# Patient Record
Sex: FEMALE | Race: WHITE | NOT HISPANIC OR LATINO | Employment: OTHER | URBAN - NONMETROPOLITAN AREA
[De-identification: names, ages, dates, MRNs, and addresses within clinical notes are randomized per-mention and may not be internally consistent; named-entity substitution may affect disease eponyms.]

---

## 2017-04-15 ENCOUNTER — HISTORY (OUTPATIENT)
Dept: EMERGENCY MEDICINE | Facility: OTHER | Age: 67
End: 2017-04-15

## 2018-02-01 ENCOUNTER — DOCUMENTATION ONLY (OUTPATIENT)
Dept: FAMILY MEDICINE | Facility: OTHER | Age: 68
End: 2018-02-01

## 2018-02-01 RX ORDER — ACETAMINOPHEN 500 MG
500 TABLET ORAL EVERY 6 HOURS PRN
COMMUNITY
Start: 2013-12-19 | End: 2019-03-27

## 2018-02-01 RX ORDER — FLUTICASONE PROPIONATE 50 MCG
1 SPRAY, SUSPENSION (ML) NASAL DAILY
COMMUNITY
Start: 2014-03-04 | End: 2019-03-27

## 2019-03-27 ENCOUNTER — OFFICE VISIT (OUTPATIENT)
Dept: OTOLARYNGOLOGY | Facility: OTHER | Age: 69
End: 2019-03-27
Attending: NURSE PRACTITIONER
Payer: COMMERCIAL

## 2019-03-27 VITALS
TEMPERATURE: 98.1 F | DIASTOLIC BLOOD PRESSURE: 58 MMHG | OXYGEN SATURATION: 98 % | BODY MASS INDEX: 21.85 KG/M2 | HEART RATE: 82 BPM | SYSTOLIC BLOOD PRESSURE: 106 MMHG | HEIGHT: 64 IN | WEIGHT: 128 LBS

## 2019-03-27 DIAGNOSIS — H90.8 MIXED CONDUCTIVE AND SENSORINEURAL HEARING LOSS, UNSPECIFIED LATERALITY: ICD-10-CM

## 2019-03-27 DIAGNOSIS — H71.21 CHOLESTEATOMA OF RIGHT MASTOID: ICD-10-CM

## 2019-03-27 DIAGNOSIS — Z71.6 TOBACCO ABUSE COUNSELING: ICD-10-CM

## 2019-03-27 DIAGNOSIS — H70.10 CHRONIC MASTOIDITIS, UNSPECIFIED LATERALITY: ICD-10-CM

## 2019-03-27 DIAGNOSIS — Z90.89 HISTORY OF MASTOIDECTOMY: ICD-10-CM

## 2019-03-27 DIAGNOSIS — H61.899 DEBRIS IN EAR CANAL: Primary | ICD-10-CM

## 2019-03-27 DIAGNOSIS — H95.193 ENCOUNTER FOR MASTOIDECTOMY CAVITY DEBRIDEMENT, BILATERAL: ICD-10-CM

## 2019-03-27 PROCEDURE — 92504 EAR MICROSCOPY EXAMINATION: CPT | Performed by: NURSE PRACTITIONER

## 2019-03-27 PROCEDURE — 69210 REMOVE IMPACTED EAR WAX UNI: CPT | Performed by: NURSE PRACTITIONER

## 2019-03-27 PROCEDURE — 99213 OFFICE O/P EST LOW 20 MIN: CPT | Mod: 25 | Performed by: NURSE PRACTITIONER

## 2019-03-27 PROCEDURE — G0463 HOSPITAL OUTPT CLINIC VISIT: HCPCS

## 2019-03-27 RX ORDER — CIPROFLOXACIN AND DEXAMETHASONE 3; 1 MG/ML; MG/ML
4 SUSPENSION/ DROPS AURICULAR (OTIC) 2 TIMES DAILY
Qty: 1 BOTTLE | Refills: 0 | Status: SHIPPED | OUTPATIENT
Start: 2019-03-27 | End: 2019-05-31

## 2019-03-27 ASSESSMENT — MIFFLIN-ST. JEOR: SCORE: 1105.6

## 2019-03-27 ASSESSMENT — PAIN SCALES - GENERAL: PAINLEVEL: NO PAIN (0)

## 2019-03-27 NOTE — PATIENT INSTRUCTIONS
Thank you for allowing Tracy Garcia CNP and our ENT team to participate in your care.  If your medications are too expensive, please give the nurse a call.  We can possibly change this medication.  If you have a scheduling or an appointment question please contact [facility] Coordinator at their direct line [phone]  ALL nursing questions or concerns can be directed to your ENT nurse at: [phone]- Npgl    Complete drops to both ears, twice a day for one week  Complete an audiogram.  Follow  Up with ENT in 1-2 weeks.

## 2019-03-27 NOTE — NURSING NOTE
"Chief Complaint   Patient presents with     Cerumen Impaction     Ear Cleaning       Initial /58 (Cuff Size: Adult Regular)   Pulse 82   Temp 98.1  F (36.7  C) (Tympanic)   Ht 1.626 m (5' 4\")   Wt 58.1 kg (128 lb)   SpO2 98%   BMI 21.97 kg/m   Estimated body mass index is 21.97 kg/m  as calculated from the following:    Height as of this encounter: 1.626 m (5' 4\").    Weight as of this encounter: 58.1 kg (128 lb).  Medication Reconciliation: complete    Brenda Hartman LPN    "

## 2019-03-27 NOTE — PROGRESS NOTES
Otolaryngology Note         Chief Complaint:     Patient presents with:  Cerumen Impaction: Ear Cleaning         History of Present Illness:     Valentina Phillips is here today for ear cleaning. She has noted history of chronic mastoiditis along with cholesteatoma of the right mastoid, requring right canal wall down tympanomastoidectomy that was done 5/6/14 by Dr. Obrien along with distant history of mastoidectomy x 3 in the left ear.     Hearing loss has been gradual over the years with no acute changes. No fluctuating hearing loss.  No otalgia, otorrhea or aura fullness.  Vertigo: denies  Tinnitus: denies  There is no facial weakness, facial numbness or dysphagia.    Most recent audiogram 1/27/16    Current every day smoker.         Medications:     Current Outpatient Rx   Medication Sig Dispense Refill     Acetaminophen (TYLENOL EXTRA STRENGTH PO) Take as directed, as needed.       fluticasone (FLONASE) 50 MCG/ACT nasal spray Spray 2 sprays into both nostrils daily 1 Package 12            Allergies:     Allergies: Aspirin          Past Medical History:     Past Medical History:   Diagnosis Date     Disorder of teeth or supporting structures     No Comments Provided     Hearing loss     No Comments Provided     Toxic shock syndrome (H)     No Comments Provided            Past Surgical History:     Past Surgical History:   Procedure Laterality Date     MYRINGOTOMY, INSERT TUBE, COMBINED      multiple     OTHER SURGICAL HISTORY      [id number],MASTOIDECTOMY,x 3 left ear       ENT family history reviewed         Social History:     Social History     Tobacco Use     Smoking status: Current Every Day Smoker     Packs/day: 1.00     Types: Cigarettes     Smokeless tobacco: Never Used   Substance Use Topics     Alcohol use: Not on file     Drug use: Not on file            Review of Systems:     ROS: See HPI         Physical Exam:     /58 (Cuff Size: Adult Regular)   Pulse 82   Temp 98.1  F (36.7  C) (Tympanic)   Ht  "1.626 m (5' 4\")   Wt 58.1 kg (128 lb)   SpO2 98%   BMI 21.97 kg/m    General - The patient is well nourished and well developed, and appears to have good nutritional status.  Alert and oriented to person and place, answers questions and cooperates with examination appropriately.   Head and Face - Normocephalic and atraumatic, with no gross asymmetry noted.  The facial nerve is intact, with strong symmetric movements.  Voice and Breathing - The patient was breathing comfortably without the use of accessory muscles. There was no wheezing, stridor. The patients voice was clear and strong, and had appropriate pitch and quality.  Ears - External ear normal. Well healed post auricular incision. Bilateral ears with complete cerumen impaction.  Ears examined under microscope. Left ear meatoplasty. Mastoid cavity filled with debris/cerumen which was successfully debrided with use of #5 and #7 suction, along with cupped forceps. Minimal irritation developed after debridement, otherwise mastoid lining intact. Right meatoplasty and mastoid cavity with thick/hard/dry debris which small amount was successful debrided from mastoid cavity with cupped forceps, but she could not tolerate further debridement due to discomfort.  Eyes - Extraocular movements intact, and the pupils were reactive to light. Sclera were not icteric or injected, conjunctiva were pink and moist.  Mouth - Examination of the oral cavity showed pink, healthy oral mucosa. She removed lower dentures (refused removal of upper dentures) for examination. No lesions or ulcerations noted. The tongue was mobile and midline.   Throat - The walls of the oropharynx were smooth, pink, moist, symmetric, and had no lesions or ulcerations.  The tonsillar pillars and soft palate were symmetric. The uvula was midline on elevation.    Neck - Normal midline excursion of the laryngotracheal complex during swallowing.  Full range of motion on passive movement.  Palpation of the " occipital, submental, submandibular, internal jugular chain, and supraclavicular nodes did not demonstrate any abnormal lymph nodes or masses.  Palpation of the thyroid was soft and smooth, with no nodules or goiter appreciated.  The trachea was mobile and midline.  Nose - External contour is symmetric, no gross deflection or scars.  Nasal mucosa is pink and moist with no abnormal mucus. No polyps, masses, or purulence noted on examination.         Assessment and Plan:       ICD-10-CM    1. Debris in ear canal H61.90 ciprofloxacin-dexamethasone (CIPRODEX) 0.3-0.1 % otic suspension   2. History of mastoidectomy Z98.890    3. History of cholesteatoma of right mastoid H71.21    4. Chronic mastoiditis, unspecified laterality H70.10    5. Encounter for mastoidectomy cavity debridement, bilateral H95.193    6. 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 Tobacco abuse counseling Z71.6      Left mastoid cavity successfully debrided but unable to complete debridement from right mastoid cavity due to discomfort noted. Spent 30-40 minutes on debridement    Complete Ciprodex drops to both ears as directed.'    She will return in 1-2 weeks to complete debridement.    She will get an audiogram scheduled in the near future, after successful debridement.    Discussed routine 6 month ear debridement of mastoid cavities.    Discussed with patient that it takes 20 years of quitting tobacco to be at same risk of developing head and neck cancer as a person who has never used tobacco. Patient voiced understanding to ongoing risks associated with continued tobacco use. Tobacco cessation was strongly encouraged.    Encouraged her to follow-up sooner as needed.      Tracy Garcia NP  ENT  [facility], [place]  [phone]

## 2019-03-27 NOTE — LETTER
3/27/2019         RE: Valentina Phillips  [phone] [facility] [address]        Dear Colleague,    Thank you for referring your patient, Valentina Phillips, to the [facility]. Please see a copy of my visit note below.    Otolaryngology Note         Chief Complaint:     Patient presents with:  Cerumen Impaction: Ear Cleaning         History of Present Illness:     Valentina Phillips is here today for ear cleaning. She has noted history of chronic mastoiditis along with cholesteatoma of the right mastoid, requring right canal wall down tympanomastoidectomy that was done 5/6/14 by Dr. Obrien along with distant history of mastoidectomy x 3 in the left ear.     Hearing loss has been gradual over the years with no acute changes. No fluctuating hearing loss.  No otalgia, otorrhea or aura fullness.  Vertigo: denies  Tinnitus: denies  There is no facial weakness, facial numbness or dysphagia.    Most recent audiogram 1/27/16    Current every day smoker.         Medications:     Current Outpatient Rx   Medication Sig Dispense Refill     Acetaminophen (TYLENOL EXTRA STRENGTH PO) Take as directed, as needed.       fluticasone (FLONASE) 50 MCG/ACT nasal spray Spray 2 sprays into both nostrils daily 1 Package 12            Allergies:     Allergies: Aspirin          Past Medical History:     Past Medical History:   Diagnosis Date     Disorder of teeth or supporting structures     No Comments Provided     Hearing loss     No Comments Provided     Toxic shock syndrome (H)     No Comments Provided            Past Surgical History:     Past Surgical History:   Procedure Laterality Date     MYRINGOTOMY, INSERT TUBE, COMBINED      multiple     OTHER SURGICAL HISTORY      INB089,MASTOIDECTOMY,x 3 left ear       ENT family history reviewed         Social History:     Social History     Tobacco Use     Smoking status: Current Every Day Smoker     Packs/day: 1.00     Types: Cigarettes     Smokeless tobacco:  "Never Used   Substance Use Topics     Alcohol use: Not on file     Drug use: Not on file            Review of Systems:     ROS: See HPI         Physical Exam:     /58 (Cuff Size: Adult Regular)   Pulse 82   Temp 98.1  F (36.7  C) (Tympanic)   Ht 1.626 m (5' 4\")   Wt 58.1 kg (128 lb)   SpO2 98%   BMI 21.97 kg/m     General - The patient is well nourished and well developed, and appears to have good nutritional status.  Alert and oriented to person and place, answers questions and cooperates with examination appropriately.   Head and Face - Normocephalic and atraumatic, with no gross asymmetry noted.  The facial nerve is intact, with strong symmetric movements.  Voice and Breathing - The patient was breathing comfortably without the use of accessory muscles. There was no wheezing, stridor. The patients voice was clear and strong, and had appropriate pitch and quality.  Ears - External ear normal. Well healed post auricular incision. Bilateral ears with complete cerumen impaction.  Ears examined under microscope. Left ear meatoplasty. Mastoid cavity filled with debris/cerumen which was successfully debrided with use of #5 and #7 suction, along with cupped forceps. Minimal irritation developed after debridement, otherwise mastoid lining intact. Right meatoplasty and mastoid cavity with thick/hard/dry debris which small amount was successful debrided from mastoid cavity with cupped forceps, but she could not tolerate further debridement due to discomfort.  Eyes - Extraocular movements intact, and the pupils were reactive to light. Sclera were not icteric or injected, conjunctiva were pink and moist.  Mouth - Examination of the oral cavity showed pink, healthy oral mucosa. She removed lower dentures (refused removal of upper dentures) for examination. No lesions or ulcerations noted. The tongue was mobile and midline.   Throat - The walls of the oropharynx were smooth, pink, moist, symmetric, and had no " lesions or ulcerations.  The tonsillar pillars and soft palate were symmetric. The uvula was midline on elevation.    Neck - Normal midline excursion of the laryngotracheal complex during swallowing.  Full range of motion on passive movement.  Palpation of the occipital, submental, submandibular, internal jugular chain, and supraclavicular nodes did not demonstrate any abnormal lymph nodes or masses.  Palpation of the thyroid was soft and smooth, with no nodules or goiter appreciated.  The trachea was mobile and midline.  Nose - External contour is symmetric, no gross deflection or scars.  Nasal mucosa is pink and moist with no abnormal mucus. No polyps, masses, or purulence noted on examination.         Assessment and Plan:       ICD-10-CM    1. Debris in ear canal H61.90 ciprofloxacin-dexamethasone (CIPRODEX) 0.3-0.1 % otic suspension   2. History of mastoidectomy Z98.890    3. History of cholesteatoma of right mastoid H71.21    4. Chronic mastoiditis, unspecified laterality H70.10    5. Encounter for mastoidectomy cavity debridement, bilateral H95.193    6. 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 Tobacco abuse counseling Z71.6      Left mastoid cavity successfully debrided but unable to complete debridement from right mastoid cavity due to discomfort noted. Spent 30-40 minutes on debridement    Complete Ciprodex drops to both ears as directed.'    She will return in 1-2 weeks to complete debridement.    She will get an audiogram scheduled in the near future, after successful debridement.    Discussed routine 6 month ear debridement of mastoid cavities.    Discussed with patient that it takes 20 years of quitting tobacco to be at same risk of developing head and neck cancer as a person who has never used tobacco. Patient voiced understanding to ongoing risks associated with continued tobacco use. Tobacco cessation was strongly encouraged.    Encouraged her to follow-up  sooner as needed.      Tracy Garcia NP  ENT  [facility], [place]  [phone]      Again, thank you for allowing me to participate in the care of your patient.        Sincerely,        VERNON Sloan CNP

## 2019-04-04 DIAGNOSIS — H91.93 DECREASED HEARING OF BOTH EARS: Primary | ICD-10-CM

## 2019-04-08 ENCOUNTER — OFFICE VISIT (OUTPATIENT)
Dept: OTOLARYNGOLOGY | Facility: OTHER | Age: 69
End: 2019-04-08
Attending: NURSE PRACTITIONER
Payer: MEDICARE

## 2019-04-08 VITALS
HEIGHT: 64 IN | WEIGHT: 128 LBS | HEART RATE: 80 BPM | TEMPERATURE: 98.6 F | DIASTOLIC BLOOD PRESSURE: 64 MMHG | OXYGEN SATURATION: 98 % | BODY MASS INDEX: 21.85 KG/M2 | SYSTOLIC BLOOD PRESSURE: 116 MMHG

## 2019-04-08 DIAGNOSIS — Z98.890 HX OF TYMPANOMASTOIDECTOMY: Primary | ICD-10-CM

## 2019-04-08 DIAGNOSIS — H90.8 MIXED CONDUCTIVE AND SENSORINEURAL HEARING LOSS, UNSPECIFIED LATERALITY: ICD-10-CM

## 2019-04-08 DIAGNOSIS — Z71.6 TOBACCO ABUSE COUNSELING: ICD-10-CM

## 2019-04-08 DIAGNOSIS — H61.899 DEBRIS IN EAR CANAL: ICD-10-CM

## 2019-04-08 PROCEDURE — G0463 HOSPITAL OUTPT CLINIC VISIT: HCPCS | Mod: 25

## 2019-04-08 PROCEDURE — 69210 REMOVE IMPACTED EAR WAX UNI: CPT | Performed by: NURSE PRACTITIONER

## 2019-04-08 PROCEDURE — 99212 OFFICE O/P EST SF 10 MIN: CPT | Mod: 25 | Performed by: NURSE PRACTITIONER

## 2019-04-08 ASSESSMENT — PAIN SCALES - GENERAL: PAINLEVEL: NO PAIN (0)

## 2019-04-08 ASSESSMENT — MIFFLIN-ST. JEOR: SCORE: 1105.6

## 2019-04-08 NOTE — PROGRESS NOTES
"Otolaryngology Progress Note           Chief Complaint:     Chief Complaint   Patient presents with     Follow Up     Mastoid cleaning          History of Present Illness:     Valentina Phillips is a 68 year old female, history of mixed hearing loss, right intact bridge canal wall down tympanomastoidectomy 5/6/14 by Dr. Obrien, here for follow-up.     I last saw her 3/27/19 and was able to successfully debris the left ear with just minimal irritation, but had to stop debridement of the right ear due to discomfort. She has copious amounts of thick and hardened debris throughout, so had her complete antibiotic otic drops to both ears.     No changes in hearing.  No fluctuating hearing loss.  No otalgia, otorrhea or aura fullness.  Vertigo: denies  Tinnitus: denies  There is no facial weakness, facial numbness or dysphagia.    Current every day smoker.    Future audiogram set up for 4/22/19    Audiogram 1/27/16  Tympanograms Type B large volume bilaterally  Right ear thresholds moderate to severe/profound mixed hearing loss, SRT 60 dBHL, %  Left ear thresholds moderate to severe mixed hearing loss, SRT 60 dBHL, %  SRT = PTA         Review of Systems:     ROS: See HPI         Physical Exam:     /64 (BP Location: Right arm)   Pulse 80   Temp 98.6  F (37  C) (Tympanic)   Ht 1.626 m (5' 4\")   Wt 58.1 kg (128 lb)   SpO2 98%   BMI 21.97 kg/m      General - The patient is well nourished and well developed, and appears to have good nutritional status.  Alert and oriented to person and place, interactive.  Head and Face - Normocephalic and atraumatic, with no gross asymmetry noted of the contour of the facial features.  The facial nerve is intact, with strong symmetric movements.  Neck- No palpable lymphadenopathy or thyroid mass.  Trachea is midline.  Eyes - Extraocular movements intact.   Ears- External ears normal. Left meatoplasty. EAC/mastoid bowl intact, healthy, no otorrhea. Right EAC/mastoid cavity " with thick debris causing complete impaction. (see below)  Nose - Nasal mucosa is pink and moist with no abnormal mucus.  The septum was grossly midline and non-obstructive, turbinates of normal size and position.  No polyps, masses, or purulence noted on examination.  Mouth - Examination of the oral cavity shows pink, healthy, moist mucosa.  No lesions or ulceration noted. The tongue is mobile and midline.    Throat - The walls of the oropharynx were smooth, pink, moist, symmetric, and had no lesions or ulcerations.  The tonsillar pillars and soft palate were symmetric.  The uvula was midline on elevation.      On examination of the ears, I found that the ear(s) were completely impacted with dense cerumen.  Therefore, I positioned them in the examination chair in a semi-supine position, beginning with the right side. Findings consistent with meatoplasty.  I used the binocular surgical microscope to perform cerumen removal.  I began by using a cerumen loop to gently lift the edges of the cerumen mass away from the walls of the external canal.  Once I did this, I was able to suction away fragments of wax and debris using suction.  Once the mass was loose enough, the entire plug was pulled from the canal/mastoid bowl. Tissue in mastoid bowl appears healthy. No polyps or granulation tissue. Just mild irritation from where debris was removed.  The tympanic membrane was intact, no sign of perforation or middle ear effusion.           Assessment and Plan:       ICD-10-CM    1. Hx of tympanomastoidectomy Z98.890    2. Tobacco abuse counseling Z71.6    3. Debris in ear canal H61.90    4. Mixed conductive and sensorineural hearing loss, unspecified laterality H90.8      Discussed with patient that it takes 20 years of quitting tobacco to be at same risk of developing head and neck cancer as a person who has never used tobacco. Patient voiced understanding to ongoing risks associated with continued tobacco use. Tobacco  cessation was strongly encouraged.    Mastoid bowls both cleaned and healthy.    Do 4 more days of ciprodex to right ear.    Complete upcoming audiogram.    Return in 6 months for routine mastoid bowl cleaning.    Follow up sooner as needed.     Tracy Garcia NP  ENT  [facility], [place]  [phone]

## 2019-04-08 NOTE — LETTER
"    4/8/2019         RE: Valentina Phillips  [phone] [place] [address]        Dear Colleague,    Thank you for referring your patient, Valentina Phillips, to the [facility]. Please see a copy of my visit note below.    Otolaryngology Progress Note           Chief Complaint:     Chief Complaint   Patient presents with     Follow Up     Mastoid cleaning          History of Present Illness:     Valentina Phillips is a 68 year old female, history of mixed hearing loss, right intact bridge canal wall down tympanomastoidectomy 5/6/14 by Dr. Obrien, here for follow-up.     I last saw her 3/27/19 and was able to successfully debris the left ear with just minimal irritation, but had to stop debridement of the right ear due to discomfort. She has copious amounts of thick and hardened debris throughout, so had her complete antibiotic otic drops to both ears.     No changes in hearing.  No fluctuating hearing loss.  No otalgia, otorrhea or aura fullness.  Vertigo: denies  Tinnitus: denies  There is no facial weakness, facial numbness or dysphagia.    Current every day smoker.    Future audiogram set up for 4/22/19    Audiogram 1/27/16  Tympanograms Type B large volume bilaterally  Right ear thresholds moderate to severe/profound mixed hearing loss, SRT 60 dBHL, %  Left ear thresholds moderate to severe mixed hearing loss, SRT 60 dBHL, %  SRT = PTA         Review of Systems:     ROS: See HPI         Physical Exam:     /64 (BP Location: Right arm)   Pulse 80   Temp 98.6  F (37  C) (Tympanic)   Ht 1.626 m (5' 4\")   Wt 58.1 kg (128 lb)   SpO2 98%   BMI 21.97 kg/m       General - The patient is well nourished and well developed, and appears to have good nutritional status.  Alert and oriented to person and place, interactive.  Head and Face - Normocephalic and atraumatic, with no gross asymmetry noted of the contour of the facial features.  The facial nerve is intact, with " strong symmetric movements.  Neck- No palpable lymphadenopathy or thyroid mass.  Trachea is midline.  Eyes - Extraocular movements intact.   Ears- External ears normal. Left meatoplasty. EAC/mastoid bowl intact, healthy, no otorrhea. Right EAC/mastoid cavity with thick debris causing complete impaction. (see below)  Nose - Nasal mucosa is pink and moist with no abnormal mucus.  The septum was grossly midline and non-obstructive, turbinates of normal size and position.  No polyps, masses, or purulence noted on examination.  Mouth - Examination of the oral cavity shows pink, healthy, moist mucosa.  No lesions or ulceration noted. The tongue is mobile and midline.    Throat - The walls of the oropharynx were smooth, pink, moist, symmetric, and had no lesions or ulcerations.  The tonsillar pillars and soft palate were symmetric.  The uvula was midline on elevation.      On examination of the ears, I found that the ear(s) were completely impacted with dense cerumen.  Therefore, I positioned them in the examination chair in a semi-supine position, beginning with the right side. Findings consistent with meatoplasty.  I used the binocular surgical microscope to perform cerumen removal.  I began by using a cerumen loop to gently lift the edges of the cerumen mass away from the walls of the external canal.  Once I did this, I was able to suction away fragments of wax and debris using suction.  Once the mass was loose enough, the entire plug was pulled from the canal/mastoid bowl. Tissue in mastoid bowl appears healthy. No polyps or granulation tissue. Just mild irritation from where debris was removed.  The tympanic membrane was intact, no sign of perforation or middle ear effusion.           Assessment and Plan:       ICD-10-CM    1. Hx of tympanomastoidectomy Z98.890    2. Tobacco abuse counseling Z71.6    3. Debris in ear canal H61.90    4. Mixed conductive and sensorineural hearing loss, unspecified laterality H90.8       Discussed with patient that it takes 20 years of quitting tobacco to be at same risk of developing head and neck cancer as a person who has never used tobacco. Patient voiced understanding to ongoing risks associated with continued tobacco use. Tobacco cessation was strongly encouraged.    Mastoid bowls both cleaned and healthy.    Do 4 more days of ciprodex to right ear.    Complete upcoming audiogram.    Return in 6 months for routine mastoid bowl cleaning.    Follow up sooner as needed.     Tracy Garcia NP  ENT  [facility] [place], [place]  [phone]              Again, thank you for allowing me to participate in the care of your patient.        Sincerely,        Tracy Garcia, VERNON CNP

## 2019-04-08 NOTE — PATIENT INSTRUCTIONS
Thank you for allowing Tracy Garcia CNP and our ENT team to participate in your care.  If your medications are too expensive, please give the nurse a call.  We can possibly change this medication.  If you have a scheduling or an appointment question please contact [facility] Unit Coordinator at their direct line [phone]  ALL nursing questions or concerns can be directed to your ENT nurse at: [phone] - Christy    Continue drops to ears for 4 days.  Follow up in 6 months for routine ear cleaning.

## 2019-04-08 NOTE — NURSING NOTE
"Chief Complaint   Patient presents with     Follow Up     Mastoid cleaning       Initial /64 (BP Location: Right arm)   Pulse 80   Temp 98.6  F (37  C) (Tympanic)   Ht 1.626 m (5' 4\")   Wt 58.1 kg (128 lb)   SpO2 98%   BMI 21.97 kg/m   Estimated body mass index is 21.97 kg/m  as calculated from the following:    Height as of this encounter: 1.626 m (5' 4\").    Weight as of this encounter: 58.1 kg (128 lb).  Medication Reconciliation: complete    Jory Forrester LPN  "

## 2019-04-22 ENCOUNTER — OFFICE VISIT (OUTPATIENT)
Dept: AUDIOLOGY | Facility: OTHER | Age: 69
End: 2019-04-22
Attending: AUDIOLOGIST
Payer: MEDICARE

## 2019-04-22 DIAGNOSIS — H90.6 MIXED HEARING LOSS, BILATERAL: Primary | ICD-10-CM

## 2019-04-22 DIAGNOSIS — H90.6 MIXED CONDUCTIVE AND SENSORINEURAL HEARING LOSS OF BOTH EARS: ICD-10-CM

## 2019-04-22 PROCEDURE — 92567 TYMPANOMETRY: CPT | Performed by: AUDIOLOGIST

## 2019-04-22 PROCEDURE — 92557 COMPREHENSIVE HEARING TEST: CPT | Performed by: AUDIOLOGIST

## 2019-04-22 NOTE — PROGRESS NOTES
Audiology Evaluation Completed. Please refer SCANNED AUDIOGRAM and/or TYMPANOGRAM for BACKGROUND, RESULTS, RECOMMENDATIONS.      Trang VARGAS, [facility]  Audiologist #[id number]

## 2019-05-30 ENCOUNTER — HOSPITAL ENCOUNTER (EMERGENCY)
Facility: OTHER | Age: 69
Discharge: HOME | End: 2019-05-30
Attending: FAMILY MEDICINE | Admitting: FAMILY MEDICINE
Payer: MEDICARE

## 2019-05-30 ENCOUNTER — APPOINTMENT (OUTPATIENT)
Dept: GENERAL RADIOLOGY | Facility: OTHER | Age: 69
End: 2019-05-30
Attending: FAMILY MEDICINE
Payer: MEDICARE

## 2019-05-30 ENCOUNTER — NURSE TRIAGE (OUTPATIENT)
Dept: FAMILY MEDICINE | Facility: OTHER | Age: 69
End: 2019-05-30

## 2019-05-30 VITALS
WEIGHT: 128 LBS | RESPIRATION RATE: 20 BRPM | SYSTOLIC BLOOD PRESSURE: 121 MMHG | TEMPERATURE: 98.5 F | HEIGHT: 64 IN | OXYGEN SATURATION: 97 % | BODY MASS INDEX: 21.85 KG/M2 | DIASTOLIC BLOOD PRESSURE: 60 MMHG | HEART RATE: 60 BPM

## 2019-05-30 DIAGNOSIS — R04.2 HEMOPTYSIS: ICD-10-CM

## 2019-05-30 DIAGNOSIS — F17.200 TOBACCO DEPENDENCE SYNDROME: ICD-10-CM

## 2019-05-30 LAB
ABO + RH BLD: NORMAL
ABO + RH BLD: NORMAL
ALBUMIN SERPL-MCNC: 3.9 G/DL (ref 3.5–5.7)
ALBUMIN UR-MCNC: NEGATIVE MG/DL
ALP SERPL-CCNC: 87 U/L (ref 34–104)
ALT SERPL W P-5'-P-CCNC: 18 U/L (ref 7–52)
ANION GAP SERPL CALCULATED.3IONS-SCNC: 5 MMOL/L (ref 3–14)
APPEARANCE UR: CLEAR
AST SERPL W P-5'-P-CCNC: 18 U/L (ref 13–39)
BASOPHILS # BLD AUTO: 0.1 10E9/L (ref 0–0.2)
BASOPHILS NFR BLD AUTO: 0.8 %
BILIRUB SERPL-MCNC: 0.7 MG/DL (ref 0.3–1)
BILIRUB UR QL STRIP: NEGATIVE
BLD GP AB SCN SERPL QL: NORMAL
BLOOD BANK CMNT PATIENT-IMP: NORMAL
BUN SERPL-MCNC: 11 MG/DL (ref 7–25)
CALCIUM SERPL-MCNC: 9.2 MG/DL (ref 8.6–10.3)
CHLORIDE SERPL-SCNC: 105 MMOL/L (ref 98–107)
CO2 SERPL-SCNC: 28 MMOL/L (ref 21–31)
COLOR UR AUTO: YELLOW
CREAT SERPL-MCNC: 0.71 MG/DL (ref 0.6–1.2)
DIFFERENTIAL METHOD BLD: NORMAL
EOSINOPHIL # BLD AUTO: 0.2 10E9/L (ref 0–0.7)
EOSINOPHIL NFR BLD AUTO: 2.7 %
ERYTHROCYTE [DISTWIDTH] IN BLOOD BY AUTOMATED COUNT: 13.5 % (ref 10–15)
GFR SERPL CREATININE-BSD FRML MDRD: 82 ML/MIN/{1.73_M2}
GLUCOSE SERPL-MCNC: 93 MG/DL (ref 70–105)
GLUCOSE UR STRIP-MCNC: NEGATIVE MG/DL
HCT VFR BLD AUTO: 43.9 % (ref 35–47)
HGB BLD-MCNC: 14.6 G/DL (ref 11.7–15.7)
HGB UR QL STRIP: NEGATIVE
IMM GRANULOCYTES # BLD: 0 10E9/L (ref 0–0.4)
IMM GRANULOCYTES NFR BLD: 0.2 %
INR PPP: 0.94 (ref 0–1.3)
KETONES UR STRIP-MCNC: NEGATIVE MG/DL
LEUKOCYTE ESTERASE UR QL STRIP: NEGATIVE
LYMPHOCYTES # BLD AUTO: 3.2 10E9/L (ref 0.8–5.3)
LYMPHOCYTES NFR BLD AUTO: 36.7 %
MCH RBC QN AUTO: 30.8 PG (ref 26.5–33)
MCHC RBC AUTO-ENTMCNC: 33.3 G/DL (ref 31.5–36.5)
MCV RBC AUTO: 93 FL (ref 78–100)
MONOCYTES # BLD AUTO: 0.6 10E9/L (ref 0–1.3)
MONOCYTES NFR BLD AUTO: 7 %
NEUTROPHILS # BLD AUTO: 4.6 10E9/L (ref 1.6–8.3)
NEUTROPHILS NFR BLD AUTO: 52.6 %
NITRATE UR QL: NEGATIVE
PH UR STRIP: 7.5 PH (ref 5–9)
PLATELET # BLD AUTO: 236 10E9/L (ref 150–450)
POTASSIUM SERPL-SCNC: 4 MMOL/L (ref 3.5–5.1)
PROT SERPL-MCNC: 6.7 G/DL (ref 6.4–8.9)
RBC # BLD AUTO: 4.74 10E12/L (ref 3.8–5.2)
SODIUM SERPL-SCNC: 138 MMOL/L (ref 134–144)
SOURCE: NORMAL
SP GR UR STRIP: 1.01 (ref 1–1.03)
SPECIMEN EXP DATE BLD: NORMAL
UROBILINOGEN UR STRIP-ACNC: 0.2 EU/DL (ref 0.2–1)
WBC # BLD AUTO: 8.7 10E9/L (ref 4–11)

## 2019-05-30 PROCEDURE — 99284 EMERGENCY DEPT VISIT MOD MDM: CPT | Mod: 25 | Performed by: FAMILY MEDICINE

## 2019-05-30 PROCEDURE — 80053 COMPREHEN METABOLIC PANEL: CPT | Performed by: FAMILY MEDICINE

## 2019-05-30 PROCEDURE — 85025 COMPLETE CBC W/AUTO DIFF WBC: CPT | Performed by: FAMILY MEDICINE

## 2019-05-30 PROCEDURE — 86900 BLOOD TYPING SEROLOGIC ABO: CPT | Performed by: FAMILY MEDICINE

## 2019-05-30 PROCEDURE — 36415 COLL VENOUS BLD VENIPUNCTURE: CPT | Performed by: FAMILY MEDICINE

## 2019-05-30 PROCEDURE — 85610 PROTHROMBIN TIME: CPT | Performed by: FAMILY MEDICINE

## 2019-05-30 PROCEDURE — 86850 RBC ANTIBODY SCREEN: CPT | Performed by: FAMILY MEDICINE

## 2019-05-30 PROCEDURE — 87040 BLOOD CULTURE FOR BACTERIA: CPT | Performed by: FAMILY MEDICINE

## 2019-05-30 PROCEDURE — 86901 BLOOD TYPING SEROLOGIC RH(D): CPT | Performed by: FAMILY MEDICINE

## 2019-05-30 PROCEDURE — 71046 X-RAY EXAM CHEST 2 VIEWS: CPT

## 2019-05-30 PROCEDURE — 99283 EMERGENCY DEPT VISIT LOW MDM: CPT | Mod: Z6 | Performed by: FAMILY MEDICINE

## 2019-05-30 PROCEDURE — 81003 URINALYSIS AUTO W/O SCOPE: CPT | Performed by: FAMILY MEDICINE

## 2019-05-30 ASSESSMENT — ENCOUNTER SYMPTOMS
CARDIOVASCULAR NEGATIVE: 1
RHINORRHEA: 1
ABDOMINAL PAIN: 0
SHORTNESS OF BREATH: 0
GASTROINTESTINAL NEGATIVE: 1
FEVER: 0
COUGH: 0

## 2019-05-30 ASSESSMENT — MIFFLIN-ST. JEOR: SCORE: 1105.6

## 2019-05-30 NOTE — TELEPHONE ENCOUNTER
"Writer was asked to complete triage on patient by Marlyn in scheduling as she is calling in as she is vomiting blood. Writer accepted soft transfer and completed triage as follows with disposition for an ED visit now. Patient is in agreement with disposition as noted. States she will come into the ED now for an evaluation. Writer will close this encounter at this time.    Reason for Disposition    Unclear to triager if the patient is coughing up blood or vomiting blood    Additional Information    Negative: Severe difficulty breathing (e.g., struggling for each breath, speaks in single words)    Negative: [1] Chest pain AND [2] difficulty breathing    Negative: Bluish (or gray) lips or face now    Negative: Passed out (i.e., lost consciousness, collapsed and was not responding)    Negative: Shock suspected (e.g., cold/pale/clammy skin, too weak to stand, low BP, rapid pulse)    Negative: Difficult to awaken or acting confused (e.g., disoriented, slurred speech)    Negative: Recent chest injury (i.e., past 24 hours)    Negative: [1] Coughed up blood AND [2] large amount (example: \"a cup of blood\")    Negative: Sounds like a life-threatening emergency to the triager    Negative: Difficulty breathing    Negative: Chest pain    Answer Assessment - Initial Assessment Questions  1. ONSET: \"When did you start coughing up blood?\"      This morning she woke up and was coughing blood. Started about a half an hour ago.   2. SEVERITY: \"How many times?\" \"How much blood?\" (e.g., flecks, streaks, tablespoons, etc)      She was in the bathroom for 5-10 minutes. She states it was a lot of blood. She states it came out of her sinuses as well when she blew her nose. It was not blood clots or anything. It is definitely blood though.  3. COUGHING SPASMS: \"Did the blood appear after a coughing spell?\"        No. It was like she got up to clear her throat and then is started.  4. RESPIRATORY DISTRESS: \"Describe your breathing.\"       She " "is breathing as per her norm. She does have allergies. She is stuffed up.  5. FEVER: \"Do you have a fever?\" If so, ask: \"What is your temperature, how was it measured, and when did it start?\"      Denies a fever.  6. SPUTUM: \"Describe the color of your sputum\" (clear, white, yellow, green), \"Has there been any change recently?\"      Sputum is red.  7. CARDIAC HISTORY: \"Do you have any history of heart disease?\" (e.g., heart attack, congestive heart failure)       She feels fine. Stomach is a little queasy. She didn't fall or hit herself. No cardiac history as per patient. She does feel weak.   8. LUNG HISTORY: \"Do you have any history of lung disease?\"  (e.g., pulmonary embolus, asthma, emphysema)      She has major allergies. She has asthma as well.  9. PE RISK FACTORS: \"Do you have a history of blood clots?\" (or: recent major surgery, recent prolonged travel, bedridden )      Her legs have hurt for 2-3 days now. They just throb.  10. OTHER SYMPTOMS: \"Do you have any other symptoms?\" (e.g., nosebleed, chest pain, abdominal pain, vomiting)        Abdominal pain. Throat is sore from coughing it up. It was sore when she swallowed.  11. PREGNANCY: \"Is there any chance you are pregnant?\" \"When was your last menstrual period?\"        N/A  12. TRAVEL: \"Have you traveled out of the country in the last month?\" (e.g., travel history, exposures)        No    Protocols used: COUGHING UP BLOOD-A-    Yuniel Luevano RN on 5/30/2019 at 10:42 AM  "

## 2019-05-30 NOTE — ED AVS SNAPSHOT
[facility]  [address]  [place] [address]  Phone:  [phone]  Fax:  [phone]                                    Valentina Phillips   MRN: [id number]    Department:  [facility]   Date of Visit:  5/30/2019           After Visit Summary Signature Page    I have received my discharge instructions, and my questions have been answered. I have discussed any challenges I see with this plan with the nurse or doctor.    ..........................................................................................................................................  Patient/Patient Representative Signature      ..........................................................................................................................................  Patient Representative Print Name and Relationship to Patient    ..................................................               ................................................  Date                                   Time    ..........................................................................................................................................  Reviewed by Signature/Title    ...................................................              ..............................................  Date                                               Time          22EPIC Rev 08/18

## 2019-05-30 NOTE — ED NOTES
Pt brought cup with sample of what she has been coughing up.  Appears bright red mixed with clear sputum.

## 2019-05-30 NOTE — ED TRIAGE NOTES
Pt states when she woke this AM she started coughing up blood and mucous.  States it has since slowed down, but pt feels light headed, has sore throat, and nauseated off and on.  Denies having bloody nose.

## 2019-05-30 NOTE — DISCHARGE INSTRUCTIONS
Dear Ms. Alan,  It was nice to meet you.  As we talked, your initial labs and chest x-ray don't show an obvious cause of your hemoptysis (coughing up blood).  Your urine is clear.  It will be important to have clinic follow up tomorrow for repeat Hemoglobin check and scheduling pulmonology consult.    Long term it is most important to stop tobacco.    Return as needed for any worsening symptoms.    Dr. Brett Armando

## 2019-05-30 NOTE — ED PROVIDER NOTES
History     Chief Complaint   Patient presents with     Hematemesis     HPI  Valentina Phillips is a 68 year old female who woke up between 8 9 AM this morning and began coughing up blood.  She coughed up some bright red blood and says it was about a cup's worth and brings in a small sample to show what look like.  She started gagging after coughing of the blood because of the taste and had some nausea associated with that including some blood coming out of her nose but no vomiting, or vomiting blood.  She reports she did not have a bloody nose first.  She is not feeling short of breath.  She is not having a fever.  She is using Flonase nasal spray for her seasonal allergies right now.  She completed her Ciprodex eardrops..    Allergies:  Allergies   Allergen Reactions     Aspirin Shortness Of Breath       Problem List:    Patient Active Problem List    Diagnosis Date Noted     Lyme disease 07/27/2016     Priority: Medium     Cholesteatoma of right mastoid 04/14/2014     Priority: Medium     Nasal polyps 03/20/2014     Priority: Medium     Sinusitis, chronic 03/20/2014     Priority: Medium     Problem list name updated by automated process. Provider to review       MHL (mixed hearing loss) 03/20/2014     Priority: Medium     History of mastoidectomy 03/20/2014     Priority: Medium     Chronic mastoiditis 02/28/2014     Priority: Medium     Carotid artery calcification 12/19/2013     Priority: Medium     Ear canal mass 12/19/2013     Priority: Medium     Ear drainage right 12/19/2013     Priority: Medium     Smoker 12/19/2013     Priority: Medium     Mastoiditis 12/19/2013     Priority: Medium        Past Medical History:    Past Medical History:   Diagnosis Date     Disorder of teeth or supporting structures      Hearing loss      Toxic shock syndrome (H)        Past Surgical History:    Past Surgical History:   Procedure Laterality Date     MYRINGOTOMY, INSERT TUBE, COMBINED      multiple     OTHER SURGICAL HISTORY   "    [id number],MASTOIDECTOMY,x 3 left ear       Family History:    Family History   Problem Relation Age of Onset     Cancer Father         Cancer, age 68 of lung cancer     Other - See Comments Mother         Psychiatric illness,dementia, at [facility]       Social History:  Marital Status:   [2]  Social History     Tobacco Use     Smoking status: Current Every Day Smoker     Packs/day: 1.00     Types: Cigarettes     Smokeless tobacco: Never Used   Substance Use Topics     Alcohol use: None     Drug use: None        Medications:      Acetaminophen (TYLENOL EXTRA STRENGTH PO)   fluticasone (FLONASE) 50 MCG/ACT nasal spray         Review of Systems   Constitutional: Negative for fever.   HENT: Positive for congestion and rhinorrhea (Seasonal allergies.).    Respiratory: Negative for cough and shortness of breath.    Cardiovascular: Negative.  Negative for chest pain.   Gastrointestinal: Negative.  Negative for abdominal pain.        No melena or hematochezia.   Skin: Negative.        Physical Exam   BP: 119/58  Pulse: 84  Temp: 98.4  F (36.9  C)  Resp: 22  Height: 162.6 cm (5' 4\")  Weight: 58.1 kg (128 lb)  SpO2: 95 %      Physical Exam   Constitutional: She appears well-developed and well-nourished. No distress.   Well-appearing 68-year-old female in no current distress.  No cough.  Benign vital signs.  Hard of hearing with a left hearing aid in place.   HENT:   Head: Normocephalic and atraumatic.   Right Ear: External ear normal.   Left Ear: External ear normal.   Mouth/Throat: Oropharynx is clear and moist.   Dentures in place.  No posterior drainage/bleeding noted.  Nose with mild chronic swelling but no obvious bleeding and no scab noted.   Eyes: Pupils are equal, round, and reactive to light. Conjunctivae and EOM are normal.   Neck: Normal range of motion. Neck supple. No tracheal deviation present. No thyromegaly present.   Cardiovascular: Normal rate, regular rhythm and normal heart sounds. "   Pulmonary/Chest: Effort normal. She has no rales.   Mild asymmetric left-sided rhonchi and rare expiratory wheeze.   Abdominal: Soft. She exhibits no distension and no mass. There is tenderness (Mild midepigastric tenderness.). There is no rebound and no guarding.   Musculoskeletal: Normal range of motion. She exhibits no edema.   Lymphadenopathy:     She has no cervical adenopathy.   Neurological: She is alert. She exhibits normal muscle tone. Coordination normal.   Skin: Skin is warm and dry. No rash noted. She is not diaphoretic.   Psychiatric: She has a normal mood and affect.   Nursing note and vitals reviewed.      ED Course        Procedures               Critical Care time:  none               Results for orders placed or performed during the hospital encounter of 05/30/19 (from the past 24 hour(s))   CBC with platelets differential   Result Value Ref Range    WBC 8.7 4.0 - 11.0 10e9/L    RBC Count 4.74 3.8 - 5.2 10e12/L    Hemoglobin 14.6 11.7 - 15.7 g/dL    Hematocrit 43.9 35.0 - 47.0 %    MCV 93 78 - 100 fl    MCH 30.8 26.5 - 33.0 pg    MCHC 33.3 31.5 - 36.5 g/dL    RDW 13.5 10.0 - 15.0 %    Platelet Count 236 150 - 450 10e9/L    Diff Method Automated Method     % Neutrophils 52.6 %    % Lymphocytes 36.7 %    % Monocytes 7.0 %    % Eosinophils 2.7 %    % Basophils 0.8 %    % Immature Granulocytes 0.2 %    Absolute Neutrophil 4.6 1.6 - 8.3 10e9/L    Absolute Lymphocytes 3.2 0.8 - 5.3 10e9/L    Absolute Monocytes 0.6 0.0 - 1.3 10e9/L    Absolute Eosinophils 0.2 0.0 - 0.7 10e9/L    Absolute Basophils 0.1 0.0 - 0.2 10e9/L    Abs Immature Granulocytes 0.0 0 - 0.4 10e9/L   ABO/Rh type and screen   Result Value Ref Range    ABO O     RH(D) Pos     Antibody Screen Neg     Test Valid Only At [facility]        Specimen Expires 06/02/2019    INR   Result Value Ref Range    INR 0.94 0 - 1.3   Comprehensive metabolic panel   Result Value Ref Range    Sodium 138 134 - 144 mmol/L    Potassium  4.0 3.5 - 5.1 mmol/L    Chloride 105 98 - 107 mmol/L    Carbon Dioxide 28 21 - 31 mmol/L    Anion Gap 5 3 - 14 mmol/L    Glucose 93 70 - 105 mg/dL    Urea Nitrogen 11 7 - 25 mg/dL    Creatinine 0.71 0.60 - 1.20 mg/dL    GFR Estimate 82 >60 mL/min/[1.73_m2]    GFR Estimate If Black >90 >60 mL/min/[1.73_m2]    Calcium 9.2 8.6 - 10.3 mg/dL    Bilirubin Total 0.7 0.3 - 1.0 mg/dL    Albumin 3.9 3.5 - 5.7 g/dL    Protein Total 6.7 6.4 - 8.9 g/dL    Alkaline Phosphatase 87 34 - 104 U/L    ALT 18 7 - 52 U/L    AST 18 13 - 39 U/L   XR Chest 2 Views    Narrative    XR CHEST 2 VW    HISTORY: 68 years Female hemoptysis versus hematemesis.    COMPARISON: 2516    TECHNIQUE: 2 views of the chest were obtained.    FINDINGS: Two views of the chest were obtained. Heart size and  pulmonary vascularity are within normal limits, lungs are clear on  both views. No consolidating air space opacities are present.          Impression    IMPRESSION: Clear chest.    KIM DAWSON MD   *UA reflex to Microscopic   Result Value Ref Range    Color Urine Yellow     Appearance Urine Clear     Glucose Urine Negative NEG^Negative mg/dL    Bilirubin Urine Negative NEG^Negative    Ketones Urine Negative NEG^Negative mg/dL    Specific Gravity Urine 1.010 1.000 - 1.030    Blood Urine Negative NEG^Negative    pH Urine 7.5 5.0 - 9.0 pH    Protein Albumin Urine Negative NEG^Negative mg/dL    Urobilinogen Urine 0.2 0.2 - 1.0 EU/dL    Nitrite Urine Negative NEG^Negative    Leukocyte Esterase Urine Negative NEG^Negative    Source Midstream Urine        Medications - No data to display    12:51 PM recheck and no further coughing or coughing up blood.  We discussed that she continues to smoke and she is strongly encouraged to stop.  We discussed initial benign laboratory evaluations and will have her follow-up in clinic tomorrow for repeat hemoglobin with plans for pulmonology consult in the near future.    1:52 PM urine is returned normal and will discharge  home with f/u tomorrow for recheck.  Consult for pulmonology placed.    Assessments & Plan (with Medical Decision Making)   68-year-old chronic smoker with new hemoptysis this morning and no further in ED.  Stable exam and CXR and initial labs.  Recommending no smoking and f/u tomorrow for recheck of hemoglobin and schedule pulmonology consult.          I have reviewed the nursing notes.    I have reviewed the findings, diagnosis, plan and need for follow up with the patient.          Medication List      ASK your doctor about these medications    ciprofloxacin-dexamethasone 0.3-0.1 % otic suspension  Commonly known as:  CIPRODEX  4 drops, Both Ears, 2 TIMES DAILY  Ask about: Should I take this medication?            Final diagnoses:   Hemoptysis   Tobacco dependence syndrome       5/30/2019   [facility]     Karlo Armando MD  05/30/19 [id number]

## 2019-05-31 ENCOUNTER — OFFICE VISIT (OUTPATIENT)
Dept: FAMILY MEDICINE | Facility: OTHER | Age: 69
End: 2019-05-31
Attending: FAMILY MEDICINE
Payer: COMMERCIAL

## 2019-05-31 ENCOUNTER — NURSE TRIAGE (OUTPATIENT)
Dept: FAMILY MEDICINE | Facility: OTHER | Age: 69
End: 2019-05-31

## 2019-05-31 VITALS
RESPIRATION RATE: 18 BRPM | TEMPERATURE: 99.2 F | WEIGHT: 131.8 LBS | OXYGEN SATURATION: 94 % | HEART RATE: 77 BPM | BODY MASS INDEX: 22.62 KG/M2 | SYSTOLIC BLOOD PRESSURE: 100 MMHG | DIASTOLIC BLOOD PRESSURE: 52 MMHG

## 2019-05-31 DIAGNOSIS — R05.3 CHRONIC COUGH: ICD-10-CM

## 2019-05-31 DIAGNOSIS — F17.200 SMOKER: Primary | ICD-10-CM

## 2019-05-31 DIAGNOSIS — R04.0 EPISTAXIS: ICD-10-CM

## 2019-05-31 PROCEDURE — 99213 OFFICE O/P EST LOW 20 MIN: CPT | Performed by: FAMILY MEDICINE

## 2019-05-31 PROCEDURE — G0463 HOSPITAL OUTPT CLINIC VISIT: HCPCS

## 2019-05-31 ASSESSMENT — PAIN SCALES - GENERAL: PAINLEVEL: NO PAIN (0)

## 2019-05-31 NOTE — PROGRESS NOTES
SUBJECTIVE:   Valentina Phillips is a 68 year old female who presents to clinic today for the following health issues:  Nursing Notes:   Nancy Bullard LPN  5/31/2019  2:00 PM  Signed  Patient is here for follow up of ER and concerns of coughing up blood. States started yesterday morning. Patient was seen in ER yesterday for this. Nancy Bullard LPN .............5/31/2019     1:40 PM    No LMP recorded. Patient is postmenopausal.  Medication Reconciliation: complete    Nancy Bullard LPN  5/31/2019 1:40 PM        HPI  67 yo female presents for follow-up after recent emergency room visit for cough.  She reports yesterday morning had one episode of hemoptysis.  There is a trace amount of bright red blood in her sputum.  She contacted nurse triage and they recommend that she be seen.    Reviewing the ER note, chest x-ray was performed which showed hyperinflation but otherwise normal.  Hemoglobin was 14.6.    Patient smokes a half a pack to 1 pack of cigarettes per day.  She has minimal interest in quitting.    Patient Active Problem List    Diagnosis Date Noted     Lyme disease 07/27/2016     Priority: Medium     Cholesteatoma of right mastoid 04/14/2014     Priority: Medium     Nasal polyps 03/20/2014     Priority: Medium     Sinusitis, chronic 03/20/2014     Priority: Medium     Problem list name updated by automated process. Provider to review       MHL (mixed hearing loss) 03/20/2014     Priority: Medium     History of mastoidectomy 03/20/2014     Priority: Medium     Chronic mastoiditis 02/28/2014     Priority: Medium     Carotid artery calcification 12/19/2013     Priority: Medium     Ear canal mass 12/19/2013     Priority: Medium     Ear drainage right 12/19/2013     Priority: Medium     Smoker 12/19/2013     Priority: Medium     Mastoiditis 12/19/2013     Priority: Medium     Past Medical History:   Diagnosis Date     Disorder of teeth or supporting structures     No Comments Provided     Hearing loss      No Comments Provided     Toxic shock syndrome (H)     No Comments Provided      Current Outpatient Medications   Medication Sig Dispense Refill     Acetaminophen (TYLENOL EXTRA STRENGTH PO) Take as directed, as needed.       fluticasone (FLONASE) 50 MCG/ACT nasal spray Spray 2 sprays into both nostrils daily 1 Package 12     Allergies   Allergen Reactions     Aspirin Shortness Of Breath       Review of Systems   See hPi  OBJECTIVE:     /52 (BP Location: Right arm, Patient Position: Sitting, Cuff Size: Adult Regular)   Pulse 77   Temp 99.2  F (37.3  C) (Tympanic)   Resp 18   Wt 59.8 kg (131 lb 12.8 oz)   SpO2 94%   Breastfeeding? No   BMI 22.62 kg/m    Body mass index is 22.62 kg/m .  Physical Exam   Constitutional: She appears well-developed.   HENT:   Trace amount of blood in left nare   Cardiovascular: Normal rate and regular rhythm.   Pulmonary/Chest: Effort normal and breath sounds normal. No stridor. No respiratory distress. She has no wheezes. She has no rales.       Diagnostic Test Results:  none     ASSESSMENT/PLAN:       1. Smoker    2. Chronic cough    3. Epistaxis      Trace amount of blood and sputum is related to epistaxis.  Likely related to dryness.  Recommend she decrease Flonase to just 1 spray per nostril at night and start using nasal saline in the morning.  Discussed importance of smoking cessation.  Reviewed patient's labs and normal chest x-ray with her.  She will follow-up as needed.  Discussed new screening recommendations in regards to annual CT scan.  She will discuss with her primary care physician.    Patient Instructions   Switch Flonase just 1 spray per nostril at night  Start using nasal saline in the morning    If bleeding persists, recommend follow-up in clinic for chest xray    Dee Dee Avalos MD  [facility]

## 2019-05-31 NOTE — TELEPHONE ENCOUNTER
Chart review of ED notes from 5/30/19 with plan as follows:    Recommending no smoking and f/u tomorrow for recheck of hemoglobin and schedule pulmonology consult.       Patient was to follow up today in the clinic and not on Monday. Patient with continued symptoms, should be seen in the clinic today at the very least. Call placed to patient to discuss. Patient reports that she thought it was funny she was following up on Monday and not today. She is willing to be seen in the clinic today. PCP with an opening at 1330 today for a half an hour. She would like that appointment time and provider. She was transferred to scheduling staff for appointment as noted above.     Writer will close this encounter.    Yuniel Luevano RN on 5/31/2019 at 9:26 AM

## 2019-05-31 NOTE — TELEPHONE ENCOUNTER
Pt was seen in the ED yesterday.  She has an appt with ZULEMA on Monday to check her hemoglobin on Monday.  She states that she is still coughing up blood but not as much.  She would like to know if she should be seen before Monday.

## 2019-05-31 NOTE — PATIENT INSTRUCTIONS
Switch Flonase just 1 spray per nostril at night  Start using nasal saline in the morning    If bleeding persists, recommend follow-up in clinic for chest xray

## 2019-06-06 LAB
BACTERIA SPEC CULT: NORMAL
BACTERIA SPEC CULT: NORMAL
SPECIMEN SOURCE: NORMAL
SPECIMEN SOURCE: NORMAL

## 2019-06-06 NOTE — RESULT ENCOUNTER NOTE
Final blood culture report is NEGATIVE.    No treatment or change in treatment per [facility] ED Lab Result protocol.

## 2019-10-02 ENCOUNTER — OFFICE VISIT (OUTPATIENT)
Dept: OTOLARYNGOLOGY | Facility: OTHER | Age: 69
End: 2019-10-02
Attending: NURSE PRACTITIONER
Payer: MEDICARE

## 2019-10-02 VITALS
BODY MASS INDEX: 23.22 KG/M2 | HEIGHT: 64 IN | WEIGHT: 136 LBS | DIASTOLIC BLOOD PRESSURE: 62 MMHG | OXYGEN SATURATION: 98 % | SYSTOLIC BLOOD PRESSURE: 110 MMHG | HEART RATE: 79 BPM | TEMPERATURE: 96.6 F

## 2019-10-02 DIAGNOSIS — H61.891 IRRITATION OF EXTERNAL EAR CANAL, RIGHT: ICD-10-CM

## 2019-10-02 DIAGNOSIS — Z71.6 TOBACCO ABUSE COUNSELING: ICD-10-CM

## 2019-10-02 DIAGNOSIS — H90.8 MIXED CONDUCTIVE AND SENSORINEURAL HEARING LOSS, UNSPECIFIED LATERALITY: ICD-10-CM

## 2019-10-02 DIAGNOSIS — Z98.890 HX OF TYMPANOMASTOIDECTOMY: Primary | ICD-10-CM

## 2019-10-02 PROCEDURE — 99213 OFFICE O/P EST LOW 20 MIN: CPT | Mod: 25 | Performed by: NURSE PRACTITIONER

## 2019-10-02 PROCEDURE — G0463 HOSPITAL OUTPT CLINIC VISIT: HCPCS

## 2019-10-02 PROCEDURE — 69210 REMOVE IMPACTED EAR WAX UNI: CPT | Performed by: NURSE PRACTITIONER

## 2019-10-02 ASSESSMENT — MIFFLIN-ST. JEOR: SCORE: 1136.89

## 2019-10-02 ASSESSMENT — PAIN SCALES - GENERAL: PAINLEVEL: NO PAIN (0)

## 2019-10-02 NOTE — LETTER
"    10/2/2019         RE: Valentina Phillips  [address]  [facility] [address]        Dear Colleague,    Thank you for referring your patient, Valentina Phillips, to the [facility]. Please see a copy of my visit note below.    Otolaryngology Progress Note           Chief Complaint:     Chief Complaint   Patient presents with     Follow Up     Mastoid Cleaning          History of Present Illness:     Valentina Phillips is a 68 year old female, history of mixed hearing loss, right intact bridge canal wall down tympanomastoidectomy 5/6/14 by Dr. Obrien, here for routine ear/mastoid bowl cleaning. I last saw her 4/8/19.     Today she reports no questions/concerns.    No changes in hearing.  No fluctuating hearing loss.  No otalgia, otorrhea or aura fullness.  Vertigo: denies  Tinnitus: denies  There is no facial weakness, facial numbness or dysphagia.    Current every day smoker.    Audiogram 4/22/19   Tympanograms Type B large volume bilaterally  Right ear thresholds moderate to profound mixed, SRT 70 dBHL, WRS 80%  Left ear thresholds moderate to severe mixed, SRT 65 dBHL, WRS 88%  Thresholds slight decreased compared to prior audiogram.   SRT = PTA      Audiogram 1/27/16  Tympanograms Type B large volume bilaterally  Right ear thresholds moderate to severe/profound mixed hearing loss, SRT 60 dBHL, %  Left ear thresholds moderate to severe mixed hearing loss, SRT 60 dBHL, %  SRT = PTA         Review of Systems:     ROS: See HPI         Physical Exam:     /62   Pulse 79   Temp 96.6  F (35.9  C) (Tympanic)   Ht 1.626 m (5' 4\")   Wt 61.7 kg (136 lb)   SpO2 98%   BMI 23.34 kg/m       General - The patient is well nourished and well developed, and appears to have good nutritional status.  Alert and oriented to person and place, interactive.  Head and Face - Normocephalic and atraumatic, with no gross asymmetry noted of the contour of the facial features.  The facial nerve " is intact, with strong symmetric movements.  Neck- No palpable lymphadenopathy or thyroid mass.  Trachea is midline.  Eyes - Extraocular movements intact.   Ears- External ears normal. Left meatoplasty.   Nose - Nasal mucosa is pink and moist with no abnormal mucus.  The septum was grossly midline and non-obstructive, turbinates of normal size and position.  No polyps, masses, or purulence noted on examination.  Mouth - Examination of the oral cavity shows pink, healthy, moist mucosa.  No lesions or ulceration noted. The tongue is mobile and midline.    Throat - The walls of the oropharynx were smooth, pink, moist, symmetric, and had no lesions or ulcerations.  The tonsillar pillars and soft palate were symmetric.  The uvula was midline on elevation.      On examination of the ears, I found that the ear(s) were completely impacted with dense cerumen.  Therefore, I positioned them in the examination chair in a semi-supine position, beginning with the right side. Findings consistent with meatoplasty.  I used the binocular surgical microscope to perform cerumen removal.  I began by using a cerumen loop to gently lift the edges of the cerumen mass away from the walls of the external canal.  Once I did this, I was able to suction away fragments of wax and debris using suction.  Once the mass was loose enough, the entire plug was pulled from the canal/mastoid bowl. Tissue in mastoid bowl appears healthy. No polyps or granulation tissue. Just mild irritation from where debris was removed.  The tympanic membrane was intact, no sign of perforation or middle ear effusion.  Left EAC cleaned of all debris with use of cerumen loop/cupped forceps.            Assessment and Plan:       ICD-10-CM    1. Hx of tympanomastoidectomy Z98.890    2. Mixed conductive and sensorineural hearing loss, unspecified laterality H90.8    3. Tobacco abuse counseling Z71.6    4. Irritation of external ear canal, right H61.891      Discussed with  patient that it takes 20 years of quitting tobacco to be at same risk of developing head and neck cancer as a person who has never used tobacco. Patient voiced understanding to ongoing risks associated with continued tobacco use. Tobacco cessation was strongly encouraged.    Mastoid bowls both cleaned and healthy.    Complete 1 week of ciprodex to right ear as directed, due to irritation from debridement.    Follow-up in 3 months for routine mastoid bowl/ear cleaning, sooner as needed.       Tracy Garcia NP  ENT  [facility], [place]  [phone]              Again, thank you for allowing me to participate in the care of your patient.        Sincerely,        VERNON Sloan CNP

## 2019-10-02 NOTE — NURSING NOTE
"Chief Complaint   Patient presents with     Follow Up     Mastoid Cleaning       Initial /62   Pulse 79   Temp 96.6  F (35.9  C) (Tympanic)   Ht 1.626 m (5' 4\")   Wt 61.7 kg (136 lb)   SpO2 98%   BMI 23.34 kg/m   Estimated body mass index is 23.34 kg/m  as calculated from the following:    Height as of this encounter: 1.626 m (5' 4\").    Weight as of this encounter: 61.7 kg (136 lb).  Medication Reconciliation: complete  Roxana Reyes LPN  "

## 2019-10-02 NOTE — PATIENT INSTRUCTIONS
Thank you for allowing Tracy Richardson and our ENT team to participate in your care.  If your medications are too expensive, please give the nurse a call.  We can possibly change this medication.  If you have a scheduling or an appointment question please contact our Health Unit Coordinator at their direct line [phone].   ALL nursing questions or concerns can be directed to your ENT nurse at: [phone]    Follow up with ENT in 3 months for an ear cleaning    Use Ciprodex 4 drops, twice daily for 1 week

## 2019-10-07 NOTE — PROGRESS NOTES
"Otolaryngology Progress Note           Chief Complaint:     Chief Complaint   Patient presents with     Follow Up     Mastoid Cleaning          History of Present Illness:     Valentina Phillips is a 68 year old female, history of mixed hearing loss, right intact bridge canal wall down tympanomastoidectomy 5/6/14 by Dr. Obrien, here for routine ear/mastoid bowl cleaning. I last saw her 4/8/19.     Today she reports no questions/concerns.    No changes in hearing.  No fluctuating hearing loss.  No otalgia, otorrhea or aura fullness.  Vertigo: denies  Tinnitus: denies  There is no facial weakness, facial numbness or dysphagia.    Current every day smoker.    Audiogram 4/22/19   Tympanograms Type B large volume bilaterally  Right ear thresholds moderate to profound mixed, SRT 70 dBHL, WRS 80%  Left ear thresholds moderate to severe mixed, SRT 65 dBHL, WRS 88%  Thresholds slight decreased compared to prior audiogram.   SRT = PTA      Audiogram 1/27/16  Tympanograms Type B large volume bilaterally  Right ear thresholds moderate to severe/profound mixed hearing loss, SRT 60 dBHL, %  Left ear thresholds moderate to severe mixed hearing loss, SRT 60 dBHL, %  SRT = PTA         Review of Systems:     ROS: See HPI         Physical Exam:     /62   Pulse 79   Temp 96.6  F (35.9  C) (Tympanic)   Ht 1.626 m (5' 4\")   Wt 61.7 kg (136 lb)   SpO2 98%   BMI 23.34 kg/m      General - The patient is well nourished and well developed, and appears to have good nutritional status.  Alert and oriented to person and place, interactive.  Head and Face - Normocephalic and atraumatic, with no gross asymmetry noted of the contour of the facial features.  The facial nerve is intact, with strong symmetric movements.  Neck- No palpable lymphadenopathy or thyroid mass.  Trachea is midline.  Eyes - Extraocular movements intact.   Ears- External ears normal. Left meatoplasty.   Nose - Nasal mucosa is pink and moist with no " abnormal mucus.  The septum was grossly midline and non-obstructive, turbinates of normal size and position.  No polyps, masses, or purulence noted on examination.  Mouth - Examination of the oral cavity shows pink, healthy, moist mucosa.  No lesions or ulceration noted. The tongue is mobile and midline.    Throat - The walls of the oropharynx were smooth, pink, moist, symmetric, and had no lesions or ulcerations.  The tonsillar pillars and soft palate were symmetric.  The uvula was midline on elevation.      On examination of the ears, I found that the ear(s) were completely impacted with dense cerumen.  Therefore, I positioned them in the examination chair in a semi-supine position, beginning with the right side. Findings consistent with meatoplasty.  I used the binocular surgical microscope to perform cerumen removal.  I began by using a cerumen loop to gently lift the edges of the cerumen mass away from the walls of the external canal.  Once I did this, I was able to suction away fragments of wax and debris using suction.  Once the mass was loose enough, the entire plug was pulled from the canal/mastoid bowl. Tissue in mastoid bowl appears healthy. No polyps or granulation tissue. Just mild irritation from where debris was removed.  The tympanic membrane was intact, no sign of perforation or middle ear effusion.  Left EAC cleaned of all debris with use of cerumen loop/cupped forceps.            Assessment and Plan:       ICD-10-CM    1. Hx of tympanomastoidectomy Z98.890    2. Mixed conductive and sensorineural hearing loss, unspecified laterality H90.8    3. Tobacco abuse counseling Z71.6    4. Irritation of external ear canal, right H61.891      Discussed with patient that it takes 20 years of quitting tobacco to be at same risk of developing head and neck cancer as a person who has never used tobacco. Patient voiced understanding to ongoing risks associated with continued tobacco use. Tobacco cessation was  strongly encouraged.    Mastoid bowls both cleaned and healthy.    Complete 1 week of ciprodex to right ear as directed, due to irritation from debridement.    Follow-up in 3 months for routine mastoid bowl/ear cleaning, sooner as needed.       Tracy Garcia NP  ENT  [facility], [place]  [phone]

## 2020-02-06 NOTE — PROGRESS NOTES
Chief Complaint   Patient presents with     Cerumen Impaction     Pt is here for an ear cleaning.       Patient returns to ENT for cleaning. Last visit was on 10/2/19 by Tracy and had a normal postoperative change.   Today she is here for routine ear exam and cleaning.     history of mixed hearing loss, right intact bridge canal wall down tympanomastoidectomy 5/6/14 by Dr. Obrien, here for routine ear/mastoid bowl cleaning. I last saw her 4/8/19.     Today she reports no questions/concerns.     No changes in hearing.  No fluctuating hearing loss.  No otalgia, otorrhea or aura fullness.  Vertigo: denies  Tinnitus: denies  There is no facial weakness, facial numbness or dysphagia.     Current every day smoker.     Audiogram 4/22/19   Tympanograms Type B large volume bilaterally  Right ear thresholds moderate to profound mixed, SRT 70 dBHL, WRS 80%  Left ear thresholds moderate to severe mixed, SRT 65 dBHL, WRS 88%  Thresholds slight decreased compared to prior audiogram.   SRT = PTA        Audiogram 1/27/16  Tympanograms Type B large volume bilaterally  Right ear thresholds moderate to severe/profound mixed hearing loss, SRT 60 dBHL, %  Left ear thresholds moderate to severe mixed hearing loss, SRT 60 dBHL, %  SRT = PTA  Past Medical History:   Diagnosis Date     Disorder of teeth or supporting structures     No Comments Provided     Hearing loss     No Comments Provided     Toxic shock syndrome (H)     No Comments Provided        Allergies   Allergen Reactions     Aspirin Shortness Of Breath     Current Outpatient Medications   Medication     Acetaminophen (TYLENOL EXTRA STRENGTH PO)     fluticasone (FLONASE) 50 MCG/ACT nasal spray     No current facility-administered medications for this visit.       ROS: 10 point ROS neg other than the symptoms noted above in the HPI.    /62   Pulse 92   Temp 97.3  F (36.3  C) (Tympanic)   Resp 20   SpO2 98%     General - The patient is well nourished and  well developed, and appears to have good nutritional status.  Alert and oriented to person and place, interactive.  Head and Face - Normocephalic and atraumatic, with no gross asymmetry noted of the contour of the facial features.  The facial nerve is intact, with strong symmetric movements.  Neck- No palpable lymphadenopathy or thyroid mass.  Trachea is midline.  Eyes - Extraocular movements intact.   Ears- External ears normal. Left meatoplasty.   Nose - Nasal mucosa is pink and moist with no abnormal mucus.  The septum was grossly midline and non-obstructive, turbinates of normal size and position.  No polyps, masses, or purulence noted on examination.  Mouth - Examination of the oral cavity shows pink, healthy, moist mucosa.  No lesions or ulceration noted. The tongue is mobile and midline.    Throat - The walls of the oropharynx were smooth, pink, moist, symmetric, and had no lesions or ulcerations.  The tonsillar pillars and soft palate were symmetric.  The uvula was midline on elevation.       On examination of the ears, I found that the ear(s) were completely impacted with dense cerumen.  Therefore, I positioned them in the examination chair in a semi-supine position, beginning with the right side. Findings consistent with meatoplasty.  I used the binocular surgical microscope to perform cerumen removal.  I began by using a cerumen loop to gently lift the edges of the cerumen mass away from the walls of the external canal.  Once I did this, I was able to suction away fragments of wax and debris using suction.  Once the mass was loose enough, the entire plug was pulled from the canal/mastoid bowl. Tissue in mastoid bowl appears healthy. No polyps or granulation tissue. Just mild irritation from where debris was removed.  The tympanic membrane was intact, no sign of perforation or middle ear effusion.  Left EAC cleaned of all debris with use of cerumen loop/cupped forceps.             ASSESSMENT:    ICD-10-CM     1. Hx of tympanomastoidectomy Z98.890 ofloxacin (FLOXIN) 0.3 % otic solution   2. Irritation of external ear canal, right H61.891 ofloxacin (FLOXIN) 0.3 % otic solution   3. Mixed conductive and sensorineural hearing loss, unspecified laterality H90.8    4. Tobacco abuse counseling Z71.6        Discussed with patient that it takes 20 years of quitting tobacco to be at same risk of developing head and neck cancer as a person who has never used tobacco. Patient voiced understanding to ongoing risks associated with continued tobacco use. Tobacco cessation was strongly encouraged.     Mastoid bowls both cleaned and healthy.     Complete 3-5 days of FLoxin to right ear.   Use drops to right ear prior to next appointment.        Follow-up in 3 months for routine mastoid bowl/ear cleaning, sooner as needed.         Nancy Kilgore PA-C  ENT  [facility], [place]  [phone]

## 2020-02-07 ENCOUNTER — OFFICE VISIT (OUTPATIENT)
Dept: OTOLARYNGOLOGY | Facility: OTHER | Age: 70
End: 2020-02-07
Attending: PHYSICIAN ASSISTANT
Payer: MEDICARE

## 2020-02-07 VITALS
SYSTOLIC BLOOD PRESSURE: 112 MMHG | TEMPERATURE: 97.3 F | OXYGEN SATURATION: 98 % | HEART RATE: 92 BPM | RESPIRATION RATE: 20 BRPM | DIASTOLIC BLOOD PRESSURE: 62 MMHG

## 2020-02-07 DIAGNOSIS — H90.8 MIXED CONDUCTIVE AND SENSORINEURAL HEARING LOSS, UNSPECIFIED LATERALITY: ICD-10-CM

## 2020-02-07 DIAGNOSIS — Z98.890 HX OF TYMPANOMASTOIDECTOMY: Primary | ICD-10-CM

## 2020-02-07 DIAGNOSIS — Z71.6 TOBACCO ABUSE COUNSELING: ICD-10-CM

## 2020-02-07 DIAGNOSIS — H61.891 IRRITATION OF EXTERNAL EAR CANAL, RIGHT: ICD-10-CM

## 2020-02-07 PROCEDURE — 92504 EAR MICROSCOPY EXAMINATION: CPT | Performed by: PHYSICIAN ASSISTANT

## 2020-02-07 PROCEDURE — 69210 REMOVE IMPACTED EAR WAX UNI: CPT | Performed by: PHYSICIAN ASSISTANT

## 2020-02-07 PROCEDURE — G0463 HOSPITAL OUTPT CLINIC VISIT: HCPCS | Mod: 25

## 2020-02-07 RX ORDER — OFLOXACIN 3 MG/ML
SOLUTION AURICULAR (OTIC)
Qty: 1 BOTTLE | Refills: 1 | Status: SHIPPED | OUTPATIENT
Start: 2020-02-07 | End: 2021-03-26

## 2020-02-07 ASSESSMENT — PAIN SCALES - GENERAL: PAINLEVEL: NO PAIN (0)

## 2020-02-07 NOTE — PATIENT INSTRUCTIONS
Ears were cleaned.   Follow up in 3 months for ear cleaning.     Use Floxin drops for 5 days to right ear as directed.   Prior to next ear cleaning use ear drops to right ear for 3-5 days prior to appointment.       Thank you for allowing Nancy Kilgore PA-C and our ENT team to participate in your care.  If your medications are too expensive, please give the nurse a call.  We can possibly change this medication.  If you have a scheduling or an appointment question please contact our Health Unit Coordinator at their direct line [phone].   ALL nursing questions or concerns can be directed to your ENT nurse at: [phone] Lina

## 2020-02-07 NOTE — NURSING NOTE
"Chief Complaint   Patient presents with     Cerumen Impaction     Pt is here for an ear cleaning.       Initial /62   Pulse 92   Temp 97.3  F (36.3  C) (Tympanic)   Resp 20   SpO2 98%  Estimated body mass index is 23.34 kg/m  as calculated from the following:    Height as of 10/2/19: 1.626 m (5' 4\").    Weight as of 10/2/19: 61.7 kg (136 lb).  Medication Reconciliation: complete  Alana Street LPN    "

## 2020-02-07 NOTE — LETTER
2/7/2020         RE: Valentina Phillips  [address]  [facility] [address]        Dear Colleague,    Thank you for referring your patient, Valentina Phillips, to the [facility]. Please see a copy of my visit note below.    Chief Complaint   Patient presents with     Cerumen Impaction     Pt is here for an ear cleaning.       Patient returns to ENT for cleaning. Last visit was on 10/2/19 by Tracy and had a normal postoperative change.   Today she is here for routine ear exam and cleaning.     history of mixed hearing loss, right intact bridge canal wall down tympanomastoidectomy 5/6/14 by Dr. Obrien, here for routine ear/mastoid bowl cleaning. I last saw her 4/8/19.     Today she reports no questions/concerns.     No changes in hearing.  No fluctuating hearing loss.  No otalgia, otorrhea or aura fullness.  Vertigo: denies  Tinnitus: denies  There is no facial weakness, facial numbness or dysphagia.     Current every day smoker.     Audiogram 4/22/19   Tympanograms Type B large volume bilaterally  Right ear thresholds moderate to profound mixed, SRT 70 dBHL, WRS 80%  Left ear thresholds moderate to severe mixed, SRT 65 dBHL, WRS 88%  Thresholds slight decreased compared to prior audiogram.   SRT = PTA        Audiogram 1/27/16  Tympanograms Type B large volume bilaterally  Right ear thresholds moderate to severe/profound mixed hearing loss, SRT 60 dBHL, %  Left ear thresholds moderate to severe mixed hearing loss, SRT 60 dBHL, %  SRT = PTA  Past Medical History:   Diagnosis Date     Disorder of teeth or supporting structures     No Comments Provided     Hearing loss     No Comments Provided     Toxic shock syndrome (H)     No Comments Provided        Allergies   Allergen Reactions     Aspirin Shortness Of Breath     Current Outpatient Medications   Medication     Acetaminophen (TYLENOL EXTRA STRENGTH PO)     fluticasone (FLONASE) 50 MCG/ACT nasal spray     No current  facility-administered medications for this visit.       ROS: 10 point ROS neg other than the symptoms noted above in the HPI.    /62   Pulse 92   Temp 97.3  F (36.3  C) (Tympanic)   Resp 20   SpO2 98%     General - The patient is well nourished and well developed, and appears to have good nutritional status.  Alert and oriented to person and place, interactive.  Head and Face - Normocephalic and atraumatic, with no gross asymmetry noted of the contour of the facial features.  The facial nerve is intact, with strong symmetric movements.  Neck- No palpable lymphadenopathy or thyroid mass.  Trachea is midline.  Eyes - Extraocular movements intact.   Ears- External ears normal. Left meatoplasty.   Nose - Nasal mucosa is pink and moist with no abnormal mucus.  The septum was grossly midline and non-obstructive, turbinates of normal size and position.  No polyps, masses, or purulence noted on examination.  Mouth - Examination of the oral cavity shows pink, healthy, moist mucosa.  No lesions or ulceration noted. The tongue is mobile and midline.    Throat - The walls of the oropharynx were smooth, pink, moist, symmetric, and had no lesions or ulcerations.  The tonsillar pillars and soft palate were symmetric.  The uvula was midline on elevation.       On examination of the ears, I found that the ear(s) were completely impacted with dense cerumen.  Therefore, I positioned them in the examination chair in a semi-supine position, beginning with the right side. Findings consistent with meatoplasty.  I used the binocular surgical microscope to perform cerumen removal.  I began by using a cerumen loop to gently lift the edges of the cerumen mass away from the walls of the external canal.  Once I did this, I was able to suction away fragments of wax and debris using suction.  Once the mass was loose enough, the entire plug was pulled from the canal/mastoid bowl. Tissue in mastoid bowl appears healthy. No polyps or  granulation tissue. Just mild irritation from where debris was removed.  The tympanic membrane was intact, no sign of perforation or middle ear effusion.  Left EAC cleaned of all debris with use of cerumen loop/cupped forceps.             ASSESSMENT:    ICD-10-CM    1. Hx of tympanomastoidectomy Z98.890 ofloxacin (FLOXIN) 0.3 % otic solution   2. Irritation of external ear canal, right H61.891 ofloxacin (FLOXIN) 0.3 % otic solution   3. Mixed conductive and sensorineural hearing loss, unspecified laterality H90.8    4. Tobacco abuse counseling Z71.6        Discussed with patient that it takes 20 years of quitting tobacco to be at same risk of developing head and neck cancer as a person who has never used tobacco. Patient voiced understanding to ongoing risks associated with continued tobacco use. Tobacco cessation was strongly encouraged.     Mastoid bowls both cleaned and healthy.     Complete 3-5 days of FLoxin to right ear.   Use drops to right ear prior to next appointment.        Follow-up in 3 months for routine mastoid bowl/ear cleaning, sooner as needed.         Nancy Kilgore PA-C  ENT  [facility]  [phone]      Again, thank you for allowing me to participate in the care of your patient.        Sincerely,        Nancy Kilgore PA-C

## 2021-03-22 DIAGNOSIS — J33.9 NASAL POLYPS: ICD-10-CM

## 2021-03-22 NOTE — TELEPHONE ENCOUNTER
Flonase  Last Written Prescription Date: 5/27/15  Last Fill Quantity: 1 # of Refills: 12  Last Office Visit: 2/7/20

## 2021-03-23 RX ORDER — FLUTICASONE PROPIONATE 50 MCG
2 SPRAY, SUSPENSION (ML) NASAL DAILY
Qty: 16 G | Refills: 1 | Status: SHIPPED | OUTPATIENT
Start: 2021-03-23 | End: 2021-07-06

## 2021-03-26 ENCOUNTER — OFFICE VISIT (OUTPATIENT)
Dept: OTOLARYNGOLOGY | Facility: OTHER | Age: 71
End: 2021-03-26
Attending: PHYSICIAN ASSISTANT
Payer: MEDICARE

## 2021-03-26 VITALS
OXYGEN SATURATION: 97 % | TEMPERATURE: 97.9 F | BODY MASS INDEX: 22.2 KG/M2 | SYSTOLIC BLOOD PRESSURE: 114 MMHG | DIASTOLIC BLOOD PRESSURE: 56 MMHG | HEART RATE: 79 BPM | WEIGHT: 130 LBS | HEIGHT: 64 IN

## 2021-03-26 DIAGNOSIS — H61.893 IRRITATION OF EXTERNAL EAR CANAL, BILATERAL: ICD-10-CM

## 2021-03-26 DIAGNOSIS — Z71.6 TOBACCO ABUSE COUNSELING: ICD-10-CM

## 2021-03-26 DIAGNOSIS — H90.8 MIXED CONDUCTIVE AND SENSORINEURAL HEARING LOSS, UNSPECIFIED LATERALITY: ICD-10-CM

## 2021-03-26 DIAGNOSIS — Z98.890 HX OF TYMPANOMASTOIDECTOMY: ICD-10-CM

## 2021-03-26 DIAGNOSIS — H95.193 ENCOUNTER FOR MASTOIDECTOMY CAVITY DEBRIDEMENT, BILATERAL: Primary | ICD-10-CM

## 2021-03-26 PROCEDURE — 99213 OFFICE O/P EST LOW 20 MIN: CPT | Mod: 25 | Performed by: PHYSICIAN ASSISTANT

## 2021-03-26 PROCEDURE — 92504 EAR MICROSCOPY EXAMINATION: CPT | Performed by: PHYSICIAN ASSISTANT

## 2021-03-26 PROCEDURE — G0463 HOSPITAL OUTPT CLINIC VISIT: HCPCS | Mod: 25

## 2021-03-26 PROCEDURE — 69220 CLEAN OUT MASTOID CAVITY: CPT | Performed by: PHYSICIAN ASSISTANT

## 2021-03-26 RX ORDER — OFLOXACIN 3 MG/ML
5 SOLUTION AURICULAR (OTIC) 2 TIMES DAILY
Qty: 10 ML | Refills: 1 | Status: SHIPPED | OUTPATIENT
Start: 2021-03-26 | End: 2021-03-31

## 2021-03-26 ASSESSMENT — MIFFLIN-ST. JEOR: SCORE: 1104.68

## 2021-03-26 ASSESSMENT — PAIN SCALES - GENERAL: PAINLEVEL: NO PAIN (0)

## 2021-03-26 NOTE — PATIENT INSTRUCTIONS
Start Floxin 5 drops twice a day for 5-7 days to both ears.   Return in 6 months for ear cleaning.   Ears were cleaned.       Thank you for allowing Nancy Kilgore PA-C and our ENT team to participate in your care.  If your medications are too expensive, please give the nurse a call.  We can possibly change this medication.  If you have a scheduling or an appointment question please contact our Health Unit Coordinator at [phone], [phone]. [phone].    ALL nursing questions or concerns can be directed to your ENT nurse at: [phone] Lina

## 2021-03-26 NOTE — LETTER
3/26/2021         RE: Valentina Phillips  [address]  [facility] [address]        Dear Colleague,    Thank you for referring your patient, Valentina Phillips, to the [facility]. Please see a copy of my visit note below.    Chief Complaint   Patient presents with     Cerumen Impaction     Pt is here for an ear cleaning.     Patient returns to ENT for cleaning. Last visit was on 2/7/20 for routine ear cleaning.   Today she is here for routine ear exam and cleaning.   history of mixed hearing loss, right intact bridge canal wall down tympanomastoidectomy 5/6/14 by Dr. Obrien, here for routine ear/mastoid bowl cleaning. I last saw her 4/8/19.      Today she reports no questions/concerns.  She hs been doing well. Reports feedback from her aids.        No changes in hearing.  No fluctuating hearing loss.  No otalgia, otorrhea or aura fullness.  Vertigo: denies  Tinnitus: denies  There is no facial weakness, facial numbness or dysphagia.     Current every day smoker.     Audiogram 4/22/19   Tympanograms Type B large volume bilaterally  Right ear thresholds moderate to profound mixed, SRT 70 dBHL, WRS 80%  Left ear thresholds moderate to severe mixed, SRT 65 dBHL, WRS 88%  Thresholds slight decreased compared to prior audiogram.   SRT = PTA        Audiogram 1/27/16  Tympanograms Type B large volume bilaterally  Right ear thresholds moderate to severe/profound mixed hearing loss, SRT 60 dBHL, %  Left ear thresholds moderate to severe mixed hearing loss, SRT 60 dBHL, %  SRT = PTA      Past Medical History:   Diagnosis Date     Disorder of teeth or supporting structures     No Comments Provided     Hearing loss     No Comments Provided     Toxic shock syndrome (H)     No Comments Provided        Allergies   Allergen Reactions     Aspirin Shortness Of Breath     Current Outpatient Medications   Medication     Acetaminophen (TYLENOL EXTRA STRENGTH PO)     fluticasone (FLONASE) 50  "MCG/ACT nasal spray     ofloxacin (FLOXIN) 0.3 % otic solution     No current facility-administered medications for this visit.       ROS: 10 point ROS neg other than the symptoms noted above in the HPI.  /56 (BP Location: Left arm, Patient Position: Sitting, Cuff Size: Adult Regular)   Pulse 79   Temp 97.9  F (36.6  C) (Tympanic)   Ht 1.626 m (5' 4\")   Wt 59 kg (130 lb)   SpO2 97%   BMI 22.31 kg/m      General - The patient is well nourished and well developed, and appears to have good nutritional status.  Alert and oriented to person and place, interactive.  Head and Face - Normocephalic and atraumatic, with no gross asymmetry noted of the contour of the facial features.  The facial nerve is intact, with strong symmetric movements.  Neck- No palpable lymphadenopathy or thyroid mass.  Trachea is midline.  Eyes - Extraocular movements intact.   Ears- External ears normal. Left meatoplasty.   Nose - Nasal mucosa is pink and moist with no abnormal mucus.  The septum was grossly midline and non-obstructive, turbinates of normal size and position.  No polyps, masses, or purulence noted on examination.  Mouth - Examination of the oral cavity shows pink, healthy, moist mucosa.  No lesions or ulceration noted. The tongue is mobile and midline.    Throat - The walls of the oropharynx were smooth, pink, moist, symmetric, and had no lesions or ulcerations.  The tonsillar pillars and soft palate were symmetric.  The uvula was midline on elevation.       On examination of the ears, I found that the ear(s) were completely impacted with dense cerumen.  Therefore, I positioned them in the examination chair in a semi-supine position, beginning with the right side. Findings consistent with meatoplasty.  I used the binocular surgical microscope to perform cerumen removal.  I began by using a cerumen loop to gently lift the edges of the cerumen mass away from the walls of the external canal.  Once I did this, I was able to " suction away fragments of wax and debris using suction.  Once the mass was loose enough, the entire plug was pulled from the canal/mastoid bowl. Tissue in mastoid bowl appears healthy. No polyps or granulation tissue. Just mild irritation from where debris was removed.  The tympanic membrane was intact, no sign of perforation or middle ear effusion.    Left EAC/ mastoidcleaned of all debris with use of cerumen loop/cupped forceps.        ASSESSMENT:    ICD-10-CM    1. Encounter for mastoidectomy cavity debridement, bilateral  H95.193    2. Hx of tympanomastoidectomy  Z98.890 ofloxacin (FLOXIN) 0.3 % otic solution   3. Irritation of external ear canal, bilateral  H61.893 ofloxacin (FLOXIN) 0.3 % otic solution   4. Mixed conductive and sensorineural hearing loss, unspecified laterality  H90.8 ofloxacin (FLOXIN) 0.3 % otic solution   5. Tobacco abuse counseling  Z71.6          Ears/ mastoid bowls were cleaned today  Complete otics due to canal irritation following cleaning  Return in 6 months for ear/ mastoid cleaning.     Complete 3-5 days of FLoxin to right ear.   Use drops to right ear prior to next appointment.       Discussed with patient that it takes 20 years of quitting tobacco to be at same risk of developing head and neck cancer as a person who has never used tobacco. Patient voiced understanding to ongoing risks associated with continued tobacco use. Tobacco cessation was strongly encouraged.            Nancy Kilgore PA-C  ENT  [facility], [place]  [phone]            Again, thank you for allowing me to participate in the care of your patient.        Sincerely,        Nancy Kilgore PA-C

## 2021-03-26 NOTE — PROGRESS NOTES
Chief Complaint   Patient presents with     Cerumen Impaction     Pt is here for an ear cleaning.     Patient returns to ENT for cleaning. Last visit was on 2/7/20 for routine ear cleaning.   Today she is here for routine ear exam and cleaning.   history of mixed hearing loss, right intact bridge canal wall down tympanomastoidectomy 5/6/14 by Dr. Obrien, here for routine ear/mastoid bowl cleaning. I last saw her 4/8/19.      Today she reports no questions/concerns.  She hs been doing well. Reports feedback from her aids.        No changes in hearing.  No fluctuating hearing loss.  No otalgia, otorrhea or aura fullness.  Vertigo: denies  Tinnitus: denies  There is no facial weakness, facial numbness or dysphagia.     Current every day smoker.     Audiogram 4/22/19   Tympanograms Type B large volume bilaterally  Right ear thresholds moderate to profound mixed, SRT 70 dBHL, WRS 80%  Left ear thresholds moderate to severe mixed, SRT 65 dBHL, WRS 88%  Thresholds slight decreased compared to prior audiogram.   SRT = PTA        Audiogram 1/27/16  Tympanograms Type B large volume bilaterally  Right ear thresholds moderate to severe/profound mixed hearing loss, SRT 60 dBHL, %  Left ear thresholds moderate to severe mixed hearing loss, SRT 60 dBHL, %  SRT = PTA      Past Medical History:   Diagnosis Date     Disorder of teeth or supporting structures     No Comments Provided     Hearing loss     No Comments Provided     Toxic shock syndrome (H)     No Comments Provided        Allergies   Allergen Reactions     Aspirin Shortness Of Breath     Current Outpatient Medications   Medication     Acetaminophen (TYLENOL EXTRA STRENGTH PO)     fluticasone (FLONASE) 50 MCG/ACT nasal spray     ofloxacin (FLOXIN) 0.3 % otic solution     No current facility-administered medications for this visit.       ROS: 10 point ROS neg other than the symptoms noted above in the HPI.  /56 (BP Location: Left arm, Patient Position:  "Sitting, Cuff Size: Adult Regular)   Pulse 79   Temp 97.9  F (36.6  C) (Tympanic)   Ht 1.626 m (5' 4\")   Wt 59 kg (130 lb)   SpO2 97%   BMI 22.31 kg/m      General - The patient is well nourished and well developed, and appears to have good nutritional status.  Alert and oriented to person and place, interactive.  Head and Face - Normocephalic and atraumatic, with no gross asymmetry noted of the contour of the facial features.  The facial nerve is intact, with strong symmetric movements.  Neck- No palpable lymphadenopathy or thyroid mass.  Trachea is midline.  Eyes - Extraocular movements intact.   Ears- External ears normal. Left meatoplasty.   Nose - Nasal mucosa is pink and moist with no abnormal mucus.  The septum was grossly midline and non-obstructive, turbinates of normal size and position.  No polyps, masses, or purulence noted on examination.  Mouth - Examination of the oral cavity shows pink, healthy, moist mucosa.  No lesions or ulceration noted. The tongue is mobile and midline.    Throat - The walls of the oropharynx were smooth, pink, moist, symmetric, and had no lesions or ulcerations.  The tonsillar pillars and soft palate were symmetric.  The uvula was midline on elevation.       On examination of the ears, I found that the ear(s) were completely impacted with dense cerumen.  Therefore, I positioned them in the examination chair in a semi-supine position, beginning with the right side. Findings consistent with meatoplasty.  I used the binocular surgical microscope to perform cerumen removal.  I began by using a cerumen loop to gently lift the edges of the cerumen mass away from the walls of the external canal.  Once I did this, I was able to suction away fragments of wax and debris using suction.  Once the mass was loose enough, the entire plug was pulled from the canal/mastoid bowl. Tissue in mastoid bowl appears healthy. No polyps or granulation tissue. Just mild irritation from where debris " was removed.  The tympanic membrane was intact, no sign of perforation or middle ear effusion.    Left EAC/ mastoidcleaned of all debris with use of cerumen loop/cupped forceps.        ASSESSMENT:    ICD-10-CM    1. Encounter for mastoidectomy cavity debridement, bilateral  H95.193    2. Hx of tympanomastoidectomy  Z98.890 ofloxacin (FLOXIN) 0.3 % otic solution   3. Irritation of external ear canal, bilateral  H61.893 ofloxacin (FLOXIN) 0.3 % otic solution   4. Mixed conductive and sensorineural hearing loss, unspecified laterality  H90.8 ofloxacin (FLOXIN) 0.3 % otic solution   5. Tobacco abuse counseling  Z71.6          Ears/ mastoid bowls were cleaned today  Complete otics due to canal irritation following cleaning  Return in 6 months for ear/ mastoid cleaning.     Complete 3-5 days of FLoxin to right ear.   Use drops to right ear prior to next appointment.       Discussed with patient that it takes 20 years of quitting tobacco to be at same risk of developing head and neck cancer as a person who has never used tobacco. Patient voiced understanding to ongoing risks associated with continued tobacco use. Tobacco cessation was strongly encouraged.            Nancy Kilgore PA-C  ENT  [facility], [place]  [phone]

## 2021-03-26 NOTE — NURSING NOTE
"Chief Complaint   Patient presents with     Cerumen Impaction     Pt is here for an ear cleaning.       Initial /56 (BP Location: Left arm, Patient Position: Sitting, Cuff Size: Adult Regular)   Pulse 79   Temp 97.9  F (36.6  C) (Tympanic)   Ht 1.626 m (5' 4\")   Wt 59 kg (130 lb)   SpO2 97%   BMI 22.31 kg/m   Estimated body mass index is 22.31 kg/m  as calculated from the following:    Height as of this encounter: 1.626 m (5' 4\").    Weight as of this encounter: 59 kg (130 lb).  Medication Reconciliation: complete  Nina Terrell LPN    "

## 2021-07-02 DIAGNOSIS — J33.9 NASAL POLYPS: ICD-10-CM

## 2021-07-02 NOTE — TELEPHONE ENCOUNTER
[id number]      Last Written Prescription Date:  3/23/21  Last Fill Quantity: 16 g,   # refills: 1  Last Office Visit: 3/26/21  Future Office visit:       Routing refill request to provider for review/approval because:  Drug not on the [facility], [facility] or [facility] refill protocol or controlled substance

## 2021-07-06 RX ORDER — FLUTICASONE PROPIONATE 50 MCG
2 SPRAY, SUSPENSION (ML) NASAL DAILY
Qty: 16 G | Refills: 1 | Status: SHIPPED | OUTPATIENT
Start: 2021-07-06

## 2024-04-03 ENCOUNTER — OFFICE VISIT (OUTPATIENT)
Dept: FAMILY MEDICINE | Facility: OTHER | Age: 74
End: 2024-04-03
Attending: NURSE PRACTITIONER
Payer: MEDICARE

## 2024-04-03 ENCOUNTER — HOSPITAL ENCOUNTER (OUTPATIENT)
Dept: GENERAL RADIOLOGY | Facility: OTHER | Age: 74
Discharge: HOME | End: 2024-04-03
Payer: MEDICARE

## 2024-04-03 VITALS
BODY MASS INDEX: 23.05 KG/M2 | WEIGHT: 135 LBS | OXYGEN SATURATION: 97 % | TEMPERATURE: 98.9 F | HEART RATE: 76 BPM | RESPIRATION RATE: 16 BRPM | SYSTOLIC BLOOD PRESSURE: 130 MMHG | HEIGHT: 64 IN | DIASTOLIC BLOOD PRESSURE: 72 MMHG

## 2024-04-03 DIAGNOSIS — S69.91XA HAND INJURY, RIGHT, INITIAL ENCOUNTER: ICD-10-CM

## 2024-04-03 DIAGNOSIS — S62.616A CLOSED DISPLACED FRACTURE OF PROXIMAL PHALANX OF RIGHT LITTLE FINGER, INITIAL ENCOUNTER: Primary | ICD-10-CM

## 2024-04-03 PROCEDURE — G0463 HOSPITAL OUTPT CLINIC VISIT: HCPCS

## 2024-04-03 PROCEDURE — 99203 OFFICE O/P NEW LOW 30 MIN: CPT | Mod: 25

## 2024-04-03 PROCEDURE — 73130 X-RAY EXAM OF HAND: CPT | Mod: RT

## 2024-04-03 PROCEDURE — G0463 HOSPITAL OUTPT CLINIC VISIT: HCPCS | Mod: 25

## 2024-04-03 PROCEDURE — 29125 APPL SHORT ARM SPLINT STATIC: CPT | Mod: RT

## 2024-04-03 ASSESSMENT — PAIN SCALES - GENERAL: PAINLEVEL: NO PAIN (0)

## 2024-04-03 NOTE — NURSING NOTE
"Chief Complaint   Patient presents with    Musculoskeletal Problem     Right hand- fell on sidewalk last night       Patient fell on concrete sidewalk last night.- tripped on uneven part of walkway.  Treated with ice and tylenol.  Right hand is swollen and numb.  Initial /72 (BP Location: Left arm, Patient Position: Sitting, Cuff Size: Adult Regular)   Pulse 76   Temp 98.9  F (37.2  C) (Tympanic)   Resp 16   Ht 1.626 m (5' 4\")   Wt 61.2 kg (135 lb)   SpO2 97%   BMI 23.17 kg/m   Estimated body mass index is 23.17 kg/m  as calculated from the following:    Height as of this encounter: 1.626 m (5' 4\").    Weight as of this encounter: 61.2 kg (135 lb).     Advance Care Directive on file? no    FOOD SECURITY SCREENING QUESTIONS:    The next two questions are to help us understand your food security.  If you are feeling you need any assistance in this area, we have resources available to support you today.    Hunger Vital Signs:  Within the past 12 months we worried whether our food would run out before we got money to buy more. Never  Within the past 12 months the food we bought just didn't last and we didn't have money to get more. Never  Theresa Carr LPN,LPN on 4/3/2024 at 1:38 PM      Theresa Carr LPN     "

## 2024-04-03 NOTE — PROGRESS NOTES
"ASSESSMENT/PLAN:    I have reviewed the nursing notes.  I have reviewed the findings, diagnosis, plan and need for follow up with the patient.    1. Closed displaced fracture of proximal phalanx of right little finger, initial encounter  2. Hand injury, right, initial encounter  - XR Hand Right G/E 3 Views  - Orthopedic  Referral; Future  - APPLY SHORT ARM SPLINT STATIC    Patient presents with right hand pain after falling yesterday and landing on her right hand.  Right hand x-ray shows a minimally displaced intra-articular fracture at the base of the proximal phalanx of the small finger.  Joe results with patient in clinic.  Placed a referral to orthopedics for further evaluation.  Patient was placed in an ulnar gutter splint.  Advised patient to rest, use ice, and elevation and may take Tylenol and ibuprofen as needed.    Discussed warning signs/symptoms indicative of need to f/u    Follow up if symptoms persist or worsen or concerns    I explained my diagnostic considerations and recommendations to the patient, who voiced understanding and agreement with the treatment plan. All questions were answered. We discussed potential side effects of any prescribed or recommended therapies, as well as expectations for response to treatments.    Jose Conti, VERNON CNP  4/3/2024  1:47 PM    HPI:    Valentina Phillips is a 73 year old female  who [facility] to [facility] for concerns of hand injury    Patient states that she fell while walking on the sidewalk yesterday afternoon and tripped. She landed on the palm of her right hand. She had immediate pain and no swelling. She woke up this morning and her entire right hand was swollen and she has some numbness and tingling. She has been taking tylenol for the pain with good relief. She states that her pain is minimal and it feels \"tight\" when moving her fingers. She denies any redness, she has mild bruising of her right palm. She denies any prior injuries to " "her right hand.     Past Medical History:   Diagnosis Date    Disorder of teeth or supporting structures     No Comments Provided    Hearing loss     No Comments Provided    Toxic shock syndrome (H)     No Comments Provided     Past Surgical History:   Procedure Laterality Date    MYRINGOTOMY, INSERT TUBE, COMBINED      multiple    OTHER SURGICAL HISTORY      [id number],MASTOIDECTOMY,x 3 left ear     Social History     Tobacco Use    Smoking status: Every Day     Packs/day: 1     Types: Cigarettes    Smokeless tobacco: Never    Tobacco comments:     pt denied quit plan 10/2/19   Substance Use Topics    Alcohol use: Not Currently     Current Outpatient Medications   Medication Sig Dispense Refill    Acetaminophen (TYLENOL EXTRA STRENGTH PO) Take as directed, as needed.      fluticasone (FLONASE) 50 MCG/ACT nasal spray Spray 2 sprays into both nostrils daily 16 g 1     Allergies   Allergen Reactions    Aspirin Shortness Of Breath     Past medical history, past surgical history, current medications and allergies reviewed and accurate to the best of my knowledge.      ROS:  Refer to HPI    /72 (BP Location: Left arm, Patient Position: Sitting, Cuff Size: Adult Regular)   Pulse 76   Temp 98.9  F (37.2  C) (Tympanic)   Resp 16   Ht 1.626 m (5' 4\")   Wt 61.2 kg (135 lb)   SpO2 97%   BMI 23.17 kg/m      EXAM:  General Appearance: Well appearing 73 year old female, appropriate appearance for age. No acute distress   Respiratory: normal chest wall and respirations.  Normal effort. No increased work of breathing.  No cough appreciated.  Cardiac: RRR   Musculoskeletal:    Right HAND PHYSICAL EXAMINATION:  Inspection: moderate edema, no bony deformity noted, ecchymosis of proximal phalanx of little finger.    Palpation: Tenderness to palpation of 2nd-5th metacarpals. Tenderness to palpation over anatomical snuffbox/scaphoid: No.     ROM/Strength: reduced and painful    Neurovascular status: sensation and distal pulses " intact.     Neuro: Alert and oriented to person, place, and time.    Psychological: normal affect, alert, oriented, and pleasant.     Xray:  Results for orders placed or performed in visit on 04/03/24   XR Hand Right G/E 3 Views     Status: None    Narrative    PROCEDURE: XR HAND RIGHT G/E 3 VIEWS 4/3/2024 2:05 PM    HISTORY: pain of 2nd-4th metatarsals; Hand injury, right, initial  encounter    COMPARISONS: None.    TECHNIQUE: 3 views.    FINDINGS: There is an obliquely oriented intra-articular fracture  through the base of the proximal phalanx of the small finger. This  involves only a very small amount of the articular surface.    No other acute fracture or dislocation is seen. There is dorsal soft  tissue swelling.    There is moderate degenerative change in the STT joint.         Impression    IMPRESSION: Minimally displaced intra-articular fracture base of  proximal phalanx of small finger.    PREETI BARRIENTOS MD         SYSTEM ID:  [id number]

## 2024-04-10 ENCOUNTER — HOSPITAL ENCOUNTER (OUTPATIENT)
Dept: GENERAL RADIOLOGY | Facility: OTHER | Age: 74
Discharge: HOME | End: 2024-04-10
Attending: FAMILY MEDICINE
Payer: MEDICARE

## 2024-04-10 ENCOUNTER — OFFICE VISIT (OUTPATIENT)
Dept: FAMILY MEDICINE | Facility: OTHER | Age: 74
End: 2024-04-10
Payer: MEDICARE

## 2024-04-10 VITALS
TEMPERATURE: 97.4 F | BODY MASS INDEX: 23.65 KG/M2 | WEIGHT: 137.8 LBS | HEART RATE: 76 BPM | SYSTOLIC BLOOD PRESSURE: 114 MMHG | OXYGEN SATURATION: 96 % | RESPIRATION RATE: 16 BRPM | DIASTOLIC BLOOD PRESSURE: 68 MMHG

## 2024-04-10 DIAGNOSIS — S63.501A SPRAIN OF RIGHT WRIST, INITIAL ENCOUNTER: ICD-10-CM

## 2024-04-10 DIAGNOSIS — S62.616A CLOSED DISPLACED FRACTURE OF PROXIMAL PHALANX OF RIGHT LITTLE FINGER, INITIAL ENCOUNTER: Primary | ICD-10-CM

## 2024-04-10 PROCEDURE — 26740 TREAT FINGER FRACTURE EACH: CPT | Performed by: FAMILY MEDICINE

## 2024-04-10 PROCEDURE — 73110 X-RAY EXAM OF WRIST: CPT | Mod: RT

## 2024-04-10 PROCEDURE — G0463 HOSPITAL OUTPT CLINIC VISIT: HCPCS | Mod: 25 | Performed by: FAMILY MEDICINE

## 2024-04-10 PROCEDURE — 73140 X-RAY EXAM OF FINGER(S): CPT | Mod: RT

## 2024-04-10 PROCEDURE — G0463 HOSPITAL OUTPT CLINIC VISIT: HCPCS | Mod: 25

## 2024-04-10 PROCEDURE — 99212 OFFICE O/P EST SF 10 MIN: CPT | Mod: 24 | Performed by: FAMILY MEDICINE

## 2024-04-10 ASSESSMENT — PAIN SCALES - GENERAL: PAINLEVEL: MODERATE PAIN (4)

## 2024-04-10 NOTE — PROGRESS NOTES
Sports Medicine Office Note    HPI:  73-year-old female coming in for evaluation of a right hand injury that took place on 4/2.  She tripped on the sidewalk.  She landed on the palm of an outstretched right hand.  She did not have significant pain immediately but woke up the next day with significant swelling.  [facility] and found to have a fracture to the base of the fifth proximal phalanx.  She was placed in a splint.  She tolerated this well.  She still endorses some pain about the ulnar aspect of the hand, extending into the third-fifth digits.  She rates this pain at 4-5/10.  She characterized the pain as aching and throbbing.  She has difficulty bending her ulnar 3 fingers.  She has been using ice and over-the-counter medications to help with her symptoms.      EXAM:  /68 (BP Location: Right arm, Patient Position: Sitting, Cuff Size: Adult Regular)   Pulse 76   Temp 97.4  F (36.3  C) (Temporal)   Resp 16   Wt 62.5 kg (137 lb 12.8 oz)   SpO2 96%   BMI 23.65 kg/m    MUSCULOSKELETAL EXAM:  RIGHT HAND  Inspection:  -No gross deformity  -Mild bruising and swelling about the ulnar aspect of the hand/fingers extending into the wrist  -Scars:  None    Tenderness to palpation of the:  -Ulnar styloid:  Negative  -Distal radius: Positive  -Nisa's tubercle:  Negative  -Scapholunate ligament:  Negative  -Scaphoid in anatomical snuffbox:  Negative  -1st CMC joint:  Negative  -1st MCP joint:  Negative  -Metacarpals:  Negative  -Fifth digit: Positive proximally    Range of Motion:  -Able to fully extend fingers  -Difficulty fully flexing fourth and fifth digits    Strength:  - strength:  4/5    Sensation:  -Intact to light touch in the radial, median, and ulnar nerve distributions    Motor:  -Intact AIN, PIN, and IO    Special Tests:  -Evaluation for rotational deformity: Negative    Other:  -No signs of cyanosis. Normal skin temperature of the upper extremity.  -Elbow:  No gross  deformity. Full range of motion.  -Left hand:  No gross deformity. No palpable tenderness. Normal strength and ROM.      IMAGIN/3/2024: 3 view right hand x-ray  - Obliquely oriented, intra-articular fracture at the base of the proximal phalanx of the fifth digit.  Mild diffuse degenerative changes.    4/10/2024: 3 view right fifth finger x-ray  - Fracture of the base of the proximal phalanx is again noted.  No change in bony alignment.    4/10/2024: 3 view right wrist x-ray  - No fracture, dislocation, or bony lesion.      ASSESSMENT/PLAN:  Diagnoses and all orders for this visit:  Closed displaced fracture of proximal phalanx of right little finger, initial encounter  -     Orthopedic  Referral  -     XR Finger Right G/E 2 Views  -     Wrist/Arm/Hand Bracking Supplies Order Other (comments) (boxr)  -     CLOSED TX FINGER ARTICULAR FX W/O MANIP, EACH  Sprain of right wrist, initial encounter  -     XR Wrist Right G/E 3 Views  -     Wrist/Arm/Hand Bracking Supplies Order Other (comments) (boxr)    73-year-old female with a closed, minimally displaced, obliquely oriented intra-articular fracture through the dorsal base of the right fifth proximal phalanx.  X-rays from 4/3 and 4/10 were both personally reviewed in the office with the findings as demonstrated above by my interpretation.  She is now 8 days out from her injury.  She meets criteria for nonoperative management.  -Discussed options to protect this fracture, with concomitant wrist sprain, elected for a boxer brace  - Ice and OTC analgesics as needed  - Follow-up in 2 weeks for repeat x-rays (right fifth finger)    Wrist sprain:  Patient also has a concomitant wrist sprain.  X-rays were performed in the office today and personally reviewed by me with the findings as demonstrated above by my interpretation.  Tenderness at the distal radius with some bruising.  No evidence of fracture on radiographs.  - Wrist brace, ulnar boxer used for concomitant  finger fracture  - Follow-up as needed      Benson Mensah MD  4/10/2024  11:20 AM    Total time spent with this patient was 26 minutes which included chart review, visualization and independent interpretation of images, time spent with the patient, and documentation.    Procedure time:  0 minute(s)

## 2024-04-24 ENCOUNTER — HOSPITAL ENCOUNTER (OUTPATIENT)
Dept: GENERAL RADIOLOGY | Facility: OTHER | Age: 74
Discharge: HOME | End: 2024-04-24
Attending: FAMILY MEDICINE
Payer: MEDICARE

## 2024-04-24 ENCOUNTER — OFFICE VISIT (OUTPATIENT)
Dept: FAMILY MEDICINE | Facility: OTHER | Age: 74
End: 2024-04-24
Attending: FAMILY MEDICINE
Payer: MEDICARE

## 2024-04-24 VITALS
HEART RATE: 71 BPM | WEIGHT: 137.2 LBS | RESPIRATION RATE: 16 BRPM | OXYGEN SATURATION: 97 % | BODY MASS INDEX: 23.55 KG/M2 | DIASTOLIC BLOOD PRESSURE: 62 MMHG | SYSTOLIC BLOOD PRESSURE: 112 MMHG | TEMPERATURE: 97.1 F

## 2024-04-24 DIAGNOSIS — S62.616D CLOSED DISPLACED FRACTURE OF PROXIMAL PHALANX OF RIGHT LITTLE FINGER WITH ROUTINE HEALING, SUBSEQUENT ENCOUNTER: Primary | ICD-10-CM

## 2024-04-24 PROCEDURE — G0463 HOSPITAL OUTPT CLINIC VISIT: HCPCS

## 2024-04-24 PROCEDURE — 73140 X-RAY EXAM OF FINGER(S): CPT | Mod: RT

## 2024-04-24 PROCEDURE — 99207 PR FRACTURE CARE IN GLOBAL PERIOD: CPT | Mod: 24 | Performed by: FAMILY MEDICINE

## 2024-04-24 ASSESSMENT — PAIN SCALES - GENERAL: PAINLEVEL: NO PAIN (1)

## 2024-04-24 NOTE — PROGRESS NOTES
Sports Medicine Office Note    HPI:  73-year-old female coming in for follow-up evaluation of a right hand injury that she placed on .  She tripped on the sidewalk.  She landed on the palm on outstretched right hand.  In [facility] on 4/3 she was found to have a fracture to the base of the fifth proximal phalanx.  She was placed in a splint.  She followed up in this office on 4/10.  She was transition to a boxers brace.  She has tolerated this well.  She is endorsing 1/10 pain.  She characterizes the pain as dull.  No new injuries.      EXAM:  /62 (BP Location: Right arm, Patient Position: Sitting, Cuff Size: Adult Regular)   Pulse 71   Temp 97.1  F (36.2  C) (Temporal)   Resp 16   Wt 62.2 kg (137 lb 3.2 oz)   SpO2 97%   BMI 23.55 kg/m    MUSCULOSKELETAL EXAM:  RIGHT HAND/WRIST  Inspection:  -No gross deformity  -No bruising or swelling  -Scars:  None    Tenderness to palpation of the:  -Ulnar styloid:  Negative  -Distal radius:  Negative  -Nisa's tubercle:  Negative  -Scapholunate ligament:  Negative  -Scaphoid in anatomical snuffbox:  Negative  -1st CMC joint:  Negative  -1st MCP joint:  Negative  -Metacarpals: Mild pain at the distal aspect of the fifth    Range of Motion:  -Able to fully extend fingers  -Able to make full fist    Strength:  - strength:  4/5    Sensation:  -Intact to light touch in the radial, median, and ulnar nerve distributions    Motor:  -Intact AIN, PIN, and IO    Special Tests:  - Evaluation for rotational deformity: Negative    Other:  -No signs of cyanosis. Normal skin temperature of the upper extremity.  -Elbow:  No gross deformity. Full range of motion.  -Left hand:  No gross deformity. No palpable tenderness. Normal strength and ROM.      IMAGIN/3/2024: 3 view right hand x-ray  - Obliquely oriented, intra-articular fracture at the base of the proximal phalanx of the fifth digit.  Mild diffuse degenerative changes.     4/10/2024: 3 view right fifth finger  x-ray  - Fracture of the base of the proximal phalanx is again noted.  No change in bony alignment.     4/10/2024: 3 view right wrist x-ray  - No fracture, dislocation, or bony lesion.      ASSESSMENT/PLAN:  Diagnoses and all orders for this visit:  Closed displaced fracture of proximal phalanx of right little finger with routine healing, subsequent encounter  -     XR Finger Right G/E 2 Views    73-year-old female with a closed, minimally displaced, obliquely oriented intra-articular fracture through the dorsal base of the right fifth proximal phalanx.  X-rays from 4/3, 4/10, and 4/24 were all personally reviewed in the office with the findings as demonstrated above by my interpretation.  She is now 3 weeks and 1 day out from her injury.  We will continue nonoperative management.  - Continue with the boxers brace as needed  - Begin gentle ROM  - Resume normal activities over the next 2-3 weeks  - Follow-up as needed    Wrist sprain:  Previously seen for wrist sprain on 4/10.  From 4/10 were again personally reviewed in the office with the findings as demonstrated above by my interpretation.  Doing well, currently no pain.  - Bracing as needed  - Follow-up as needed      Benson Mensah MD  4/24/2024  2:03 PM    Total time spent with this patient was 18 minutes which included chart review, visualization and independent interpretation of images, time spent with the patient, and documentation.    Procedure time:  0 minute(s)